# Patient Record
Sex: FEMALE
[De-identification: names, ages, dates, MRNs, and addresses within clinical notes are randomized per-mention and may not be internally consistent; named-entity substitution may affect disease eponyms.]

---

## 2017-10-24 ENCOUNTER — HOSPITAL ENCOUNTER (OUTPATIENT)
Dept: HOSPITAL 5 - SPVIMAG | Age: 72
Discharge: HOME | End: 2017-10-24
Attending: PSYCHIATRY & NEUROLOGY
Payer: MEDICARE

## 2017-10-24 DIAGNOSIS — R26.89: ICD-10-CM

## 2017-10-24 DIAGNOSIS — G31.89: Primary | ICD-10-CM

## 2017-10-24 DIAGNOSIS — G93.89: ICD-10-CM

## 2017-10-24 PROCEDURE — 70551 MRI BRAIN STEM W/O DYE: CPT

## 2017-10-30 NOTE — MAGNETIC RESONANCE REPORT
MR scan of the cranium was performed without contrast.

Pulse sequences included:

1.  T1 weighted sagittal and axial images without contrast 

2.  T2 weighted axial and coronal images

3.  FLAIR axial images

4.  Diffusion-weighted axial images

5.  Apparent diffusion coefficient images



Views of the posterior fossa showed a normal craniocervical junction.  

Cerebellar pontine angles were normal with normal seventh-eighth nerve 

complexes.  Brainstem showed an area of increased signal in the left 

anterior juancarlos as well as some increased signal in the basis pontis in 

addition to increased signal in the joann cerebri.  The cerebellum 

showed moderate atrophy.



The ventricular system showed moderate dilatation but no distortion.



Images of the hemispheres showed large areas of atrophy in the frontal 

lobes  especially in the anterior poles.  Temporal atrophy was also 

seen.  Moderate white matter changes were present in the 

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 



Sinuses, flow voids in the [place], orbits, pituitary and 

basal ganglia were normal.





Impression:



Abnormal MR scan of the cranium without contrast.

a. 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 

cerebellar atrophy

b. ventricular dilation

c. extensive atrophy of the frontal lobes and to some extent temporal 

lobes

r/o fronto temporal atrophy (Pick's disease)



this study was compared to the patient's previous mr of 6/123/2016. The 

atrophy and the araiosis have progressed.

## 2020-01-04 ENCOUNTER — HOSPITAL ENCOUNTER (INPATIENT)
Dept: HOSPITAL 5 - ED | Age: 75
LOS: 5 days | Discharge: HOME | DRG: 65 | End: 2020-01-09
Attending: INTERNAL MEDICINE | Admitting: INTERNAL MEDICINE
Payer: MEDICARE

## 2020-01-04 DIAGNOSIS — Z86.73: ICD-10-CM

## 2020-01-04 DIAGNOSIS — I48.91: ICD-10-CM

## 2020-01-04 DIAGNOSIS — I25.10: ICD-10-CM

## 2020-01-04 DIAGNOSIS — I48.0: ICD-10-CM

## 2020-01-04 DIAGNOSIS — Z88.8: ICD-10-CM

## 2020-01-04 DIAGNOSIS — Z79.01: ICD-10-CM

## 2020-01-04 DIAGNOSIS — G93.40: ICD-10-CM

## 2020-01-04 DIAGNOSIS — Z82.49: ICD-10-CM

## 2020-01-04 DIAGNOSIS — E78.5: ICD-10-CM

## 2020-01-04 DIAGNOSIS — Z90.710: ICD-10-CM

## 2020-01-04 DIAGNOSIS — E66.9: ICD-10-CM

## 2020-01-04 DIAGNOSIS — Z83.3: ICD-10-CM

## 2020-01-04 DIAGNOSIS — I16.0: ICD-10-CM

## 2020-01-04 DIAGNOSIS — I63.9: Primary | ICD-10-CM

## 2020-01-04 DIAGNOSIS — I10: ICD-10-CM

## 2020-01-04 DIAGNOSIS — Z90.49: ICD-10-CM

## 2020-01-04 LAB
ALBUMIN SERPL-MCNC: 4 G/DL (ref 3.9–5)
ALT SERPL-CCNC: 13 UNITS/L (ref 7–56)
BASOPHILS # (AUTO): 0.1 K/MM3 (ref 0–0.1)
BASOPHILS NFR BLD AUTO: 0.5 % (ref 0–1.8)
BUN SERPL-MCNC: 11 MG/DL (ref 7–17)
BUN/CREAT SERPL: 16 %
CALCIUM SERPL-MCNC: 9.9 MG/DL (ref 8.4–10.2)
EOSINOPHIL # BLD AUTO: 0.3 K/MM3 (ref 0–0.4)
EOSINOPHIL NFR BLD AUTO: 2.5 % (ref 0–4.3)
HCT VFR BLD CALC: 47.2 % (ref 30.3–42.9)
HEMOLYSIS INDEX: 8
HGB BLD-MCNC: 15.6 GM/DL (ref 10.1–14.3)
LYMPHOCYTES # BLD AUTO: 3.8 K/MM3 (ref 1.2–5.4)
LYMPHOCYTES NFR BLD AUTO: 37.6 % (ref 13.4–35)
MCHC RBC AUTO-ENTMCNC: 33 % (ref 30–34)
MCV RBC AUTO: 90 FL (ref 79–97)
MONOCYTES # (AUTO): 0.7 K/MM3 (ref 0–0.8)
MONOCYTES % (AUTO): 7.2 % (ref 0–7.3)
PLATELET # BLD: 225 K/MM3 (ref 140–440)
RBC # BLD AUTO: 5.25 M/MM3 (ref 3.65–5.03)

## 2020-01-04 PROCEDURE — 80061 LIPID PANEL: CPT

## 2020-01-04 PROCEDURE — 84484 ASSAY OF TROPONIN QUANT: CPT

## 2020-01-04 PROCEDURE — 36415 COLL VENOUS BLD VENIPUNCTURE: CPT

## 2020-01-04 PROCEDURE — 70450 CT HEAD/BRAIN W/O DYE: CPT

## 2020-01-04 PROCEDURE — 71046 X-RAY EXAM CHEST 2 VIEWS: CPT

## 2020-01-04 PROCEDURE — 93306 TTE W/DOPPLER COMPLETE: CPT

## 2020-01-04 PROCEDURE — G0378 HOSPITAL OBSERVATION PER HR: HCPCS

## 2020-01-04 PROCEDURE — 85025 COMPLETE CBC W/AUTO DIFF WBC: CPT

## 2020-01-04 PROCEDURE — 80053 COMPREHEN METABOLIC PANEL: CPT

## 2020-01-04 PROCEDURE — 82553 CREATINE MB FRACTION: CPT

## 2020-01-04 PROCEDURE — 70544 MR ANGIOGRAPHY HEAD W/O DYE: CPT

## 2020-01-04 PROCEDURE — 82550 ASSAY OF CK (CPK): CPT

## 2020-01-04 PROCEDURE — 93005 ELECTROCARDIOGRAM TRACING: CPT

## 2020-01-04 PROCEDURE — 93010 ELECTROCARDIOGRAM REPORT: CPT

## 2020-01-04 PROCEDURE — 70551 MRI BRAIN STEM W/O DYE: CPT

## 2020-01-04 NOTE — EMERGENCY DEPARTMENT REPORT
ED Neuro Deficit HPI





- General


Chief Complaint: Neuro Symptoms/Deficit


Stated Complaint: HBP/POSS TIA


Time Seen by Provider: 01/04/20 19:14


Source: patient, EMS


Mode of arrival: Ambulatory


Limitations: No Limitations





- History of Present Illness


Initial Comments: 





Patient is a 74-year-old  female with past medical history of 

hypertension and TIAs in the past who is complaining of some left arm weakness 

and slurred speech earlier today.  Patient states when she woke this morning 

approximately 8 AM she has some left arm weakness and heaviness.  Patient states

within the hour after waking the symptoms resolved.  Around 2 PM patient was on 

the phone with a friend telling her about the weakness she had that morning and 

her friend noticed that she has slurred speech.  Patient states this also 

resolved spontaneously as well.  Patient's friend urged her to come to the 

emergency department.  Patient stated when to the fire department first and her 

blood pressure was noted to be elevated to approximately 190 systolic.  Patient 

denies chest pain shortness of breath or focal neurological symptoms at this 

time.  Patient states all of her weakness and slurred speech has completely 

resolved.  Patient states she does have hypertension and is compliant with her 

medications.





- Related Data


Allergies/Adverse Reactions: 


                                    Allergies











Allergy/AdvReac Type Severity Reaction Status Date / Time


 


meperidine HCl [From Demerol] Allergy  Vomiting Unverified 06/13/16 16:23














ED Review of Systems


ROS: 


Stated complaint: HBP/POSS TIA


Other details as noted in HPI





Comment: All other systems reviewed and negative





ED Past Medical Hx





- Past Medical History


Previous Medical History?: Yes


Hx Hypertension: Yes


Additional medical history: TIA, Glycoma,





- Surgical History


Past Surgical History?: No





- Social History


Smoking Status: Never Smoker


Substance Use Type: None





ED Neuro Physical Exam





- General


Limitations: No Limitations


General appearance: alert, in no apparent distress


Suspected Stroke: No





- Head


Head exam: Present: atraumatic, normocephalic





- Eye


Eye exam: Present: normal appearance, PERRL, EOMI





- ENT


ENT exam: Present: normal orophraynx, mucous membranes moist





- Neck


Neck exam: Present: normal inspection





- Respiratory


Respiratory exam: Present: normal lung sounds bilaterally.  Absent: respiratory 

distress, wheezes, rales, rhonchi





- Cardiovascular


Cardiovascular Exam: Present: regular rate, normal rhythm, normal heart sounds. 

Absent: systolic murmur, diastolic murmur, rubs, gallop





- GI/Abdominal


GI/Abdominal exam: Present: soft, normal bowel sounds.  Absent: distended, 

tenderness, guarding





- Extremities Exam


Extremities exam: Present: normal inspection





- Back Exam


Back exam: Present: normal inspection





- Neurological Exam


Neurological exam: Present: alert, oriented X3





- NIHSS


Assessment Interval: Baseline


1a. Level of Consciousness: alert/keenly responsive


1b. LOC Questions: answers both correctly


1c. LOC Commands: performs tasks correctly


2. Best Gaze: normal


3. Visual: no visual loss


4. Facial Palsy: normal symmetrical movement


5b. Motor Arm Right: no drift


5a. Motor Arm Left: no drift


6a. Motor Leg Left: no drift


6b. Motor Leg Right: no drift


7. Limb Ataxia: absent


8. Sensory: normal


9. Best Language: no aphasia


10. Dysarthria: normal


11. Extinction/Inattention: no abnormality


Total Score: 0


Stroke Severity: No Stroke Symptoms





- Psychiatric


Psychiatric exam: Present: normal affect, normal mood





- Skin


Skin exam: Present: warm, dry, intact, normal color.  Absent: rash





ED Course





                                   Vital Signs











  01/04/20 01/04/20





  19:22 22:42


 


Temperature 98.0 F 98.4 F


 


Pulse Rate 74 81


 


Respiratory 20 13





Rate  


 


Blood Pressure 200/97 187/102





[Left]  


 


O2 Sat by Pulse 98 





Oximetry  














- Lab Data


Result diagrams: 


                                 01/04/20 19:59





                                 01/04/20 19:59





                                   Lab Results











  01/04/20 01/04/20 Range/Units





  19:59 19:59 


 


WBC  10.2   (4.5-11.0)  K/mm3


 


RBC  5.25 H   (3.65-5.03)  M/mm3


 


Hgb  15.6 H   (10.1-14.3)  gm/dl


 


Hct  47.2 H   (30.3-42.9)  %


 


MCV  90   (79-97)  fl


 


MCH  30   (28-32)  pg


 


MCHC  33   (30-34)  %


 


RDW  14.2   (13.2-15.2)  %


 


Plt Count  225   (140-440)  K/mm3


 


Lymph % (Auto)  37.6 H   (13.4-35.0)  %


 


Mono % (Auto)  7.2   (0.0-7.3)  %


 


Eos % (Auto)  2.5   (0.0-4.3)  %


 


Baso % (Auto)  0.5   (0.0-1.8)  %


 


Lymph #  3.8   (1.2-5.4)  K/mm3


 


Mono #  0.7   (0.0-0.8)  K/mm3


 


Eos #  0.3   (0.0-0.4)  K/mm3


 


Baso #  0.1   (0.0-0.1)  K/mm3


 


Seg Neutrophils %  52.2   (40.0-70.0)  %


 


Seg Neutrophils #  5.3   (1.8-7.7)  K/mm3


 


Sodium   143  (137-145)  mmol/L


 


Potassium   4.1  (3.6-5.0)  mmol/L


 


Chloride   102.1  ()  mmol/L


 


Carbon Dioxide   26  (22-30)  mmol/L


 


Anion Gap   19  mmol/L


 


BUN   11  (7-17)  mg/dL


 


Creatinine   0.7  (0.7-1.2)  mg/dL


 


Estimated GFR   > 60  ml/min


 


BUN/Creatinine Ratio   16  %


 


Glucose   124 H  ()  mg/dL


 


Calcium   9.9  (8.4-10.2)  mg/dL


 


Total Bilirubin   0.30  (0.1-1.2)  mg/dL


 


AST   20  (5-40)  units/L


 


ALT   13  (7-56)  units/L


 


Alkaline Phosphatase   89  ()  units/L


 


Troponin T   < 0.010  (0.00-0.029)  ng/mL


 


Total Protein   7.5  (6.3-8.2)  g/dL


 


Albumin   4.0  (3.9-5)  g/dL


 


Albumin/Globulin Ratio   1.1  %














- EKG Data


-: EKG Interpreted by Me


EKG shows normal: sinus rhythm, axis, intervals, QRS complexes, ST-T waves


Rate: normal


Interpretation: normal EKG (so for isolated PAC)





- Radiology Data





Ordering Physician: PIPE DE LEÓN 


 Date of Service: 01/04/20 


 Procedure(s): CT head/brain wo con 


 Accession Number(s): [id number] 





 cc: PIPE DE LEÓN 








 NONENHANCED CT SCAN OF THE HEAD: 





 INDICATION / CLINICAL INFORMATION: 


 74 years Female; headache, slurred speech at 3:30 pm. 





 TECHNIQUE: Routine CT head without contrast. All CT scans at this location are 

performed using CT 


 dose reduction for ALARA by means of automated exposure control. 





 COMPARISON: 


 MR scan of the brain from 6/13/2016 





 FINDINGS: 





 BRAIN / INTRACRANIAL CONTENTS: No acute hemorrhage, mass effect, midline shift,

 hydrocephalus, or 


 acute, large territorial infarct. Patchy areas of volume loss is seen along the

 lateral convexity in


 both cerebral hemispheres. These remain unchanged.. As seen in the previous MRI

 scan, 


 periventricular white matter low density areas are seen in both cerebral 

hemispheres due to 


 microvascular faint angiopathy. High convexity cortical sulci are normal. 





 Volume loss is seen in the cerebellar vermis and cerebellar hemispheres. Volume

 loss in the 


 cerebellar vermis has progressed since the MRI scan. Pontine ischemia seen in 

the MRI scan is not 


 obvious in the CT. 





 CRANIOCERVICAL JUNCTION: No significant abnormality. 





 ORBITS: No significant abnormality of visualized orbits. 





 SINUSES / MASTOIDS: No significant abnormality of the visualized paranasal 

sinuses or mastoid air 


 cells. 





 ADDITIONAL FINDINGS: None. 





 IMPRESSION: 


 No hemorrhage; no stroke mimics; no CT findings to suggest acute territorial 

infarction. 





 ER physician informed at 6:54 PM Central standard time 





 Signer Name: Gm Stein MD 


 Signed: 1/4/2020 7:54 PM 


 Workstation Name: [id number] 








- Medical Decision Making





Patient is a 74-year-old  female with history of hypertension who had 2

 episodes of neurological deficits today.  Both of these episodes have resolved.

  Patient admitted for observation.


Critical care attestation.: 


If time is entered above; I have spent that time in minutes in the direct care 

of this critically ill patient, excluding procedure time.








ED Disposition


Clinical Impression: 


 TIA (transient ischemic attack), Hypertensive urgency, malignant





Disposition: DC-[id number] OP ADMIT IP TO THIS HOSP


Is pt being admited?: Yes


Does the pt Need Aspirin: No


Condition: Stable


Time of Disposition: 23:04

## 2020-01-04 NOTE — CAT SCAN REPORT
NONENHANCED CT SCAN OF THE HEAD: 



INDICATION / CLINICAL INFORMATION:

74 years Female; headache, slurred speech at 3:30 pm.



TECHNIQUE: Routine CT head without contrast. All CT scans at this location are performed using CT dos
e reduction for ALARA by means of automated exposure control. 



COMPARISON: 

MR scan of the brain from 6/13/2016



FINDINGS:



BRAIN / INTRACRANIAL CONTENTS:  No acute hemorrhage, mass effect, midline shift, hydrocephalus, or ac
lisa, large territorial infarct. Patchy areas of volume loss is seen along the lateral convexity in kristi
th cerebral hemispheres. These remain unchanged.. As seen in the previous MRI scan, periventricular w
[place] matter low density areas are seen in both cerebral hemispheres due to microvascular faint angiop
athy. High convexity cortical sulci are normal.



Volume loss is seen in the cerebellar vermis and cerebellar hemispheres. Volume loss in the cerebella
r vermis has progressed since the MRI scan. Pontine ischemia seen in the MRI scan is not obvious in t
he CT.



CRANIOCERVICAL JUNCTION: No significant abnormality.



ORBITS: No significant abnormality of visualized orbits.



SINUSES / MASTOIDS: No significant abnormality of the visualized paranasal sinuses or mastoid air marlon
ls.



ADDITIONAL FINDINGS: None. 



IMPRESSION:

 No hemorrhage; no stroke mimics; no CT findings to suggest acute territorial infarction.



ER physician informed at 6:54 PM Central standard time



Signer Name: Gm Stein MD 

Signed: 1/4/2020 7:54 PM

 Workstation Name: [id number]

## 2020-01-04 NOTE — HISTORY AND PHYSICAL REPORT
History of Present Illness


History of present illness: 


74-year-old male with history of hypertension, TIA states she is compliant with 

medication comes emergency room for evaluation.  She stated that she started 

having left arm numbness that lasted for 30 minutes after which it subsided.  

Around 330 and she is developed slurred speech she called her friend who urged 

her to go to the emergency room for evaluation.  However they went to the fire 

station her blood pressure was very high so they sent her to the emergency room.

 Slurred speech lasted for an hour, she states she is back to baseline.  Patient

is being admitted for TIA work, hypertension malignant


Review Of  Systems:


Constitutional: no weight loss,  chills


Ears, eyes, nose, mouth and throat: no nasal congestion, no nasal discharge, no 

sinus pressure, blurry vision, diplopia


Neck: No neck pain or rigidity.


Cardiovascular: No  palpitations, chest pain


Respiratory: No  cough, shortness of breath


Gastrointestinal: No hematochezia


Genitourinary : no dysuria, frequency , hematuria


Musculoskeletal: no muscle ache , joint pain


Integumentary: no rash, no pruritis


Neurological: no parathesias, focal weakness


Endocrine: no cold or heat intolerance, no polyuria or polydipsia


Hematologic/Lymphatic: no easy bruising, no easy bleeding, no gland swelling


Allergic/Immunologic: no urticaria, no angioedema.





PAST MEDICAL HISTORY:hypertension, TIA





PAST SURGICAL HISTORY: Hysterectomy, cholecystectomy





FAMILY HISTORY:hypertension, diabetes





SOCIAL HISTORY: no tobacco, no  drugs,  alcohol








Medications and Allergies


                                    Allergies











Allergy/AdvReac Type Severity Reaction Status Date / Time


 


meperidine HCl [From Demerol] Allergy  Vomiting Verified 01/04/20 23:56














Exam





- Physical Exam


Narrative exam: 


Gen. appearance: Patient lying in bed, no apparent distress


HEENT: Normocephalic, atraumatic, pupils equally round and reactive to light, 

extraocular movement intact, and no sclericterus,. No JVD or thyromegaly or 

nodule,neck supple, no carotid bruit ,mucous membranes moist, no exudate or 

erythema


Heart: S1, S2, regular rate and rhythm


Lungs: Clear to auscultation bilaterally, breathing comfortable


Abdomen: Positive bowel sounds, nontender, nondistended, no organomegaly


Extremity: No edema, cyanosis, clubbing


Skin: No rash, nodules, warm, dry


Neuro: Oriented 3, cranial nerves II-12 intact, speech is fluent, motor and 

sensory intact











- Constitutional


Vitals: 


                                        











Temp Pulse Resp BP Pulse Ox


 


 98.4 F   81   13   185/92   98 


 


 01/04/20 22:42  01/04/20 23:08  01/04/20 22:42  01/04/20 23:08  01/04/20 19:22














Results





- Labs


CBC & Chem 7: 


                                 01/04/20 19:59





                                 01/04/20 19:59


Labs: 


                              Abnormal lab results











  01/04/20 01/04/20 Range/Units





  19:59 19:59 


 


RBC  5.25 H   (3.65-5.03)  M/mm3


 


Hgb  15.6 H   (10.1-14.3)  gm/dl


 


Hct  47.2 H   (30.3-42.9)  %


 


Lymph % (Auto)  37.6 H   (13.4-35.0)  %


 


Glucose   124 H  ()  mg/dL














- Imaging and Cardiology


Chest x-ray: report reviewed


CT Scan - head: report reviewed





Assessment and Plan


Assessment


Acute TIA


Obtain MRI of the head and neck, echo


Do neuro checks consult screen


Consult neurology, PT, OT


Start aspirin, statin, check lipid profile





Hypertension


Start IV hydralazine for blood pressure control


DVT prophylax

## 2020-01-04 NOTE — XRAY REPORT
CHEST 2 VIEWS 



INDICATION: 

chest pain.



COMPARISON: 

None.



FINDINGS:

Support devices: None.



Heart: Within normal limits. 

Lungs/Pleura: No acute air space or interstitial disease.   No significant pleural effusion. 



IMPRESSION:  No acute findings.



Signer Name: Bhargav Godwin MD 

Signed: 1/4/2020 8:04 PM

 Workstation Name: [id number]

## 2020-01-04 NOTE — EVENT NOTE
ED Screening Note


Date of service: 01/04/20


Time: 19:15


ED Screening Note: 


Pt complains of episode of difficulty with speech at about 3:30 pm 


hx of TIA


+HA x 3 days





This initial assessment/diagnostic orders/clinical plan/treatment(s) is/are 

subject to change based on patients health status, clinical progression and re-

assessment by fellow clinical providers in the ED. Further treatment and workup 

at subsequent clinical providers discretion. Patient/guardian urged not to elope

from the ED as their condition may be serious if not clinically assessed and 

managed. 





Initial orders include: 


CT 


labs 


CXR

## 2020-01-05 LAB — HDLC SERPL-MCNC: 70 MG/DL (ref 40–59)

## 2020-01-05 RX ADMIN — HYDRALAZINE HYDROCHLORIDE PRN MG: 20 INJECTION INTRAMUSCULAR; INTRAVENOUS at 21:25

## 2020-01-05 RX ADMIN — ACETAMINOPHEN PRN MG: 325 TABLET ORAL at 22:27

## 2020-01-05 RX ADMIN — METOPROLOL TARTRATE SCH MG: 25 TABLET, FILM COATED ORAL at 21:26

## 2020-01-05 RX ADMIN — ACETAMINOPHEN PRN MG: 325 TABLET ORAL at 08:11

## 2020-01-05 RX ADMIN — HYDRALAZINE HYDROCHLORIDE PRN MG: 20 INJECTION INTRAMUSCULAR; INTRAVENOUS at 12:23

## 2020-01-05 RX ADMIN — ASPIRIN SCH MG: 325 TABLET ORAL at 10:35

## 2020-01-05 RX ADMIN — HYDRALAZINE HYDROCHLORIDE PRN MG: 20 INJECTION INTRAMUSCULAR; INTRAVENOUS at 01:42

## 2020-01-05 RX ADMIN — ENOXAPARIN SODIUM SCH MG: 100 INJECTION SUBCUTANEOUS at 21:26

## 2020-01-05 NOTE — PROGRESS NOTE
Assessment and Plan


Assessment and plan: 


Patient is a 74-year-old  female with past medical history of 

hypertension and TIAs in the past who is complaining of some left arm weakness 

and slurred speech earlier today.  Patient states when she woke this morning 

approximately 8 AM she has some left arm weakness and heaviness.  Patient states

within the hour after waking the symptoms resolved.  Around 2 PM patient was on 

the phone with a friend telling her about the weakness she had that morning and 

her friend noticed that she has slurred speech.  Patient states this also 

resolved spontaneously as well.  


Was admitted to the hospital for stroke protocol, not a candidate for TPA








--Acute CVA/ TIA


Not a candidate for TPA


Aspirin and statin


Neuro workup, MRI/MRA carotid Doppler echocardiogram


Urology consult


PT OT rehabilitation


Lipid panel, permissive hypertension





--Hypertension;


Per stroke protocol





--Obesity; BMI 34.6


Advised weight reduction and medically stable





--DVT prophylaxis; Lovenox





Monitor clinically and adjust management as needed





Plan of care is reviewed with the patient and her nurse














History


Interval history: 


Patient seen and examined medical records reviewed


Admitted with neuro symptoms


Patient has no new complaints


 weakness significantly improved





Hospitalist Physical





- Constitutional


Vitals: 


                                        











Temp Pulse Resp BP Pulse Ox


 


 98.0 F   92 H  18   188/108   98 


 


 01/05/20 11:55  01/05/20 13:31  01/05/20 13:31  01/05/20 12:23  01/05/20 13:31











General appearance: Present: no acute distress, well-nourished





- EENT


Eyes: Present: PERRL, EOM intact





- Neck


Neck: Present: supple, normal ROM





- Respiratory


Respiratory effort: normal


Respiratory: bilateral: diminished, negative: rales, rhonchi, wheezing





- Cardiovascular


Rhythm: regular


Heart Sounds: Present: S1 & S2





- Extremities


Extremities: no ischemia, No edema





- Abdominal


General gastrointestinal: soft, non-tender, non-distended, normal bowel sounds





- Integumentary


Integumentary: Present: clear, warm





- Psychiatric


Psychiatric: appropriate mood/affect, cooperative





- Neurologic


Neurologic: CNII-XII intact, moves all extremities





Results





- Labs


CBC & Chem 7: 


                                 01/04/20 19:59





                                 01/04/20 19:59


Labs: 


                             Laboratory Last Values











WBC  10.2 K/mm3 (4.5-11.0)   01/04/20  19:59    


 


RBC  5.25 M/mm3 (3.65-5.03)  H  01/04/20  19:59    


 


Hgb  15.6 gm/dl (10.1-14.3)  H  01/04/20  19:59    


 


Hct  47.2 % (30.3-42.9)  H  01/04/20  19:59    


 


MCV  90 fl (79-97)   01/04/20  19:59    


 


MCH  30 pg (28-32)   01/04/20  19:59    


 


MCHC  33 % (30-34)   01/04/20  19:59    


 


RDW  14.2 % (13.2-15.2)   01/04/20  19:59    


 


Plt Count  225 K/mm3 (140-440)   01/04/20  19:59    


 


Lymph % (Auto)  37.6 % (13.4-35.0)  H  01/04/20  19:59    


 


Mono % (Auto)  7.2 % (0.0-7.3)   01/04/20  19:59    


 


Eos % (Auto)  2.5 % (0.0-4.3)   01/04/20  19:59    


 


Baso % (Auto)  0.5 % (0.0-1.8)   01/04/20  19:59    


 


Lymph #  3.8 K/mm3 (1.2-5.4)   01/04/20  19:59    


 


Mono #  0.7 K/mm3 (0.0-0.8)   01/04/20  19:59    


 


Eos #  0.3 K/mm3 (0.0-0.4)   01/04/20  19:59    


 


Baso #  0.1 K/mm3 (0.0-0.1)   01/04/20  19:59    


 


Seg Neutrophils %  52.2 % (40.0-70.0)   01/04/20  19:59    


 


Seg Neutrophils #  5.3 K/mm3 (1.8-7.7)   01/04/20  19:59    


 


Sodium  143 mmol/L (137-145)   01/04/20  19:59    


 


Potassium  4.1 mmol/L (3.6-5.0)   01/04/20  19:59    


 


Chloride  102.1 mmol/L ()   01/04/20  19:59    


 


Carbon Dioxide  26 mmol/L (22-30)   01/04/20  19:59    


 


Anion Gap  19 mmol/L  01/04/20  19:59    


 


BUN  11 mg/dL (7-17)   01/04/20  19:59    


 


Creatinine  0.7 mg/dL (0.7-1.2)   01/04/20  19:59    


 


Estimated GFR  > 60 ml/min  01/04/20  19:59    


 


BUN/Creatinine Ratio  16 %  01/04/20  19:59    


 


Glucose  124 mg/dL ()  H  01/04/20  19:59    


 


Calcium  9.9 mg/dL (8.4-10.2)   01/04/20  19:59    


 


Total Bilirubin  0.30 mg/dL (0.1-1.2)   01/04/20  19:59    


 


AST  20 units/L (5-40)   01/04/20  19:59    


 


ALT  13 units/L (7-56)   01/04/20  19:59    


 


Alkaline Phosphatase  89 units/L ()   01/04/20  19:59    


 


Troponin T  < 0.010 ng/mL (0.00-0.029)   01/04/20  19:59    


 


Total Protein  7.5 g/dL (6.3-8.2)   01/04/20  19:59    


 


Albumin  4.0 g/dL (3.9-5)   01/04/20  19:59    


 


Albumin/Globulin Ratio  1.1 %  01/04/20  19:59    


 


Triglycerides  117 mg/dL (2-149)   01/05/20  08:52    


 


Cholesterol  146 mg/dL ()   01/05/20  08:52    


 


LDL Cholesterol Direct  66 mg/dL ()   01/05/20  08:52    


 


HDL Cholesterol  70 mg/dL (40-59)  H  01/05/20  08:52    


 


Cholesterol/HDL Ratio  2.08 %  01/05/20  08:52    














Active Medications





- Current Medications


Current Medications: 














Generic Name Dose Route Start Last Admin





  Trade Name Freq  PRN Reason Stop Dose Admin


 


Acetaminophen  650 mg  01/04/20 23:41  01/05/20 08:11





  Tylenol  PO   650 mg





  Q4H PRN   Administration





  Pain, Mild (1-3)  


 


Aspirin  325 mg  01/05/20 10:00  01/05/20 10:35





  Aspirin  PO   325 mg





  QDAY CHRISITAN   Administration


 


Atorvastatin Calcium  40 mg  01/05/20 22:00 





  Lipitor  PO  





  QHS CHRISTIAN  


 


Bisacodyl  10 mg  01/04/20 23:41 





  Dulcolax  [place]  





  QDAY PRN  





  Constipation  


 


Hydralazine HCl  5 mg  01/04/20 23:43  01/05/20 12:23





  Apresoline  IV   5 mg





  Q6H PRN   Administration





  Hypertension  


 


Magnesium Hydroxide  30 ml  01/04/20 23:41 





  Milk Of Magnesia  PO  





  Q4H PRN  





  Constipation  


 


Ondansetron HCl  4 mg  01/04/20 23:41  01/05/20 02:28





  Zofran  IV   4 mg





  Q8H PRN   Administration





  Nausea And Vomiting  


 


Sodium Chloride  10 ml  01/04/20 23:41 





  Sodium Chloride Flush Syringe 10 Ml  IV  





  PRN PRN  





  LINE FLUSH

## 2020-01-05 NOTE — PROGRESS NOTE
Subjective


Date of service: 01/05/20


Interval history: 


neurology note reviewed over all the labs / Imaging studies / clinical notes / 

course of care in the ED   the CT shows 4 plus very striking severe degenerative

changes both gray and white mattwer... would be very difficult to detect stroke 

since there is so little intact normal brain present  ,,, brain is profoundly 

atrophic








Objective





- Vital Sign


                               Vital Signs - 12hr











  01/04/20 01/04/20 01/04/20





  22:38 22:42 22:45


 


Temperature  98.4 F 


 


Pulse Rate  81 77


 


Respiratory  13 14





Rate   


 


Blood Pressure 149/83  191/92


 


Blood Pressure  187/102 





[Left]   


 


O2 Sat by Pulse 93  88





Oximetry   














  01/04/20 01/04/20 01/04/20





  23:01 23:08 23:15


 


Temperature   


 


Pulse Rate 80 81 76


 


Respiratory 17  17





Rate   


 


Blood Pressure 180/93 185/92 159/76


 


Blood Pressure   





[Left]   


 


O2 Sat by Pulse 97  97





Oximetry   














  01/04/20 01/04/20 01/05/20





  23:31 23:45 00:01


 


Temperature   


 


Pulse Rate 77 74 78


 


Respiratory 13 15 16





Rate   


 


Blood Pressure 201/107 192/88 167/84


 


Blood Pressure   





[Left]   


 


O2 Sat by Pulse 92 98 97





Oximetry   














  01/05/20 01/05/20 01/05/20





  00:15 00:31 00:45


 


Temperature   


 


Pulse Rate 80 84 84


 


Respiratory 13 16 14





Rate   


 


Blood Pressure 171/94 197/70 193/85


 


Blood Pressure   





[Left]   


 


O2 Sat by Pulse 97 98 99





Oximetry   














  01/05/20 01/05/20 01/05/20





  01:01 01:15 01:30


 


Temperature   


 


Pulse Rate 87 80 84


 


Respiratory 12 16 20





Rate   


 


Blood Pressure 185/86 190/88 176/84


 


Blood Pressure   





[Left]   


 


O2 Sat by Pulse 99 97 96





Oximetry   














  01/05/20 01/05/20 01/05/20





  01:42 01:45 02:00


 


Temperature   


 


Pulse Rate 84 87 90


 


Respiratory  18 13





Rate   


 


Blood Pressure 193/85 162/82 146/77


 


Blood Pressure   





[Left]   


 


O2 Sat by Pulse  97 97





Oximetry   














  01/05/20 01/05/20 01/05/20





  02:15 02:31 02:45


 


Temperature   


 


Pulse Rate 100 H 88 89


 


Respiratory 13 20 13





Rate   


 


Blood Pressure 146/77 163/67 164/76


 


Blood Pressure   





[Left]   


 


O2 Sat by Pulse 96 95 97





Oximetry   














  01/05/20 01/05/20 01/05/20





  03:00 03:15 03:30


 


Temperature   


 


Pulse Rate 90 89 94 H


 


Respiratory 15 16 17





Rate   


 


Blood Pressure 163/77 176/81 168/84


 


Blood Pressure   





[Left]   


 


O2 Sat by Pulse 95 95 96





Oximetry   














  01/05/20 01/05/20 01/05/20





  03:45 04:00 04:15


 


Temperature   


 


Pulse Rate 99 H 97 H 94 H


 


Respiratory 18 17 19





Rate   


 


Blood Pressure 168/84 189/77 183/81


 


Blood Pressure   





[Left]   


 


O2 Sat by Pulse 96 96 96





Oximetry   














  01/05/20 01/05/20 01/05/20





  04:30 04:45 05:01


 


Temperature   


 


Pulse Rate 100 H 106 H 105 H


 


Respiratory 16 18 16





Rate   


 


Blood Pressure 190/82 213/84 172/73


 


Blood Pressure   





[Left]   


 


O2 Sat by Pulse 96 95 96





Oximetry   














  01/05/20 01/05/20 01/05/20





  05:15 05:31 05:45


 


Temperature   


 


Pulse Rate 106 H 109 H 109 H


 


Respiratory 22 15 15





Rate   


 


Blood Pressure 170/93 200/87 171/92


 


Blood Pressure   





[Left]   


 


O2 Sat by Pulse 96 96 97





Oximetry   














  01/05/20





  07:42


 


Temperature 


 


Pulse Rate 112 H


 


Respiratory 21





Rate 


 


Blood Pressure 


 


Blood Pressure 121/61





[Left] 


 


O2 Sat by Pulse 97





Oximetry 














- Laboratory Findings


CBC and BMP: 


                                 01/04/20 19:59





                                 01/04/20 19:59


Abnormal Lab Findings: 


                                  Abnormal Labs











  01/04/20 01/04/20





  19:59 19:59


 


RBC  5.25 H 


 


Hgb  15.6 H 


 


Hct  47.2 H 


 


Lymph % (Auto)  37.6 H 


 


Glucose   124 H

## 2020-01-06 RX ADMIN — ENOXAPARIN SODIUM SCH: 100 INJECTION SUBCUTANEOUS at 22:18

## 2020-01-06 RX ADMIN — ASPIRIN SCH MG: 325 TABLET ORAL at 10:00

## 2020-01-06 RX ADMIN — METOPROLOL TARTRATE SCH MG: 25 TABLET, FILM COATED ORAL at 22:20

## 2020-01-06 RX ADMIN — METOPROLOL TARTRATE SCH MG: 25 TABLET, FILM COATED ORAL at 10:13

## 2020-01-06 NOTE — CONSULTATION
HISTORY OF PRESENT ILLNESS:  This is a 74-year-old white female that presents to

[facility].  She is seen by herself.  There is no family

history and no family members escorting her that I can obtain a more reliable

history from.  I cannot be sure exactly when she began to get sick.  She seems

to be somewhat confused as to how she ended up being at the hospital, but she

did state that she went to a fire station about 48 hours ago because she was not

feeling well.  Her blood pressure at that point was 200/120 and she had only

been taking atenolol.  She realized she had a problem, began to feel dizzy, off

balance, had slurred speech and from her own experience this had occurred in a

previous episode of transient ischemic attack.  She presented to the Emergency

Room from what she tells me a  from ____ [employer] who she is familiar with

stayed with her for a prolonged period of time, but she does not have a very

good recollection for what happened.  At this point, her mental state is

somewhat cleared.  She feels better.  She is less dizzy, less weak, does not

feel confused and has series of disoriented thoughts that at this point seems to

be clearing.



ALLERGIES:  None.



PAST MEDICAL HISTORY:  Stroke, TIA.



MEDICATIONS:  Atenolol.



PHYSICAL EXAMINATION:

VITAL SIGNS:  Her blood pressure at the bedside with the nurse checking at with

the automated machine is 180/105, pulse rate is 87.  Her pO2 on room air is 98%.

NEUROLOGIC:  Her  strength is equal.  Her cranial nerves are intact.  Her

speech is good.  Orientation is excellent.  She can stand up and walk, but she

is very off balance.  I did observe this that she seems to be extremely poorly

coordinated.  Reflexes are intact.  Sensory examination is normal.  Visual

fields are full.



IMPRESSION:  This patient has acute encephalopathy.  I suspect she is having a

number of small strokes.  There were very impressive abnormalities present on

her CT scan, which are old with gray and white matter atrophy and evidence of

previous white matter strokes.  Actually from the appearance of the CT scan, her

clinical condition is much more severe than her examination, which is an

interesting mismatch on the imaging versus the neurological exam.  MRI scan is

probably indicated.  I did indicate the patient her theory that the blood

pressure control is an issue certainly quite relevant at this point and that

even at this point, her blood pressure is quite elevated.  Await echocardiogram,

carotid artery ultrasound and other diagnostic workup.





DD: 01/05/2020 18:27

DT: 01/06/2020 00:54

JOB# [id number]  [id number]

JOSY/NTS

## 2020-01-06 NOTE — CONSULTATION
History of Present Illness


Consult date: 01/06/20


Consult reason: arrhythmia


History of present illness: 





This is a 74-year old woman who presented 2 days ago with left upper extremity 

weakness and dysarthria. Neurological deficits has since resolved. Head CT scan 

reports no acute intracranial abnormalities. An echocardiogram shows a normal 

left ventricular systolic function, ejection fraction 65-70%. Bubble study is 

negative. Further neurology workup is in process. 





Overnight, there was a short burst of wide complex tachycardia followed with 

transient atrial fibrillation on telemetry. She returned to stable sinus rhythm 

with no specific treatment. There is no history of atrial fibrillation. A 

cardiac consultation has been requested for further evaluation and management. 





Medications and Allergies


                                    Allergies











Allergy/AdvReac Type Severity Reaction Status Date / Time


 


meperidine HCl [From Demerol] Allergy  Vomiting Verified 01/04/20 23:56











Active Meds: 


Active Medications





Acetaminophen (Tylenol)  650 mg PO Q4H PRN


   PRN Reason: Pain, Mild (1-3)


   Last Admin: 01/05/20 22:27 Dose:  650 mg


   Documented by: 


Aspirin (Aspirin)  325 mg PO QDAY [facility]


   Last Admin: 01/06/20 10:00 Dose:  325 mg


   Documented by: 


Atorvastatin Calcium (Lipitor)  40 mg PO QHS [facility]


   Last Admin: 01/05/20 21:26 Dose:  40 mg


   Documented by: 


Bisacodyl (Dulcolax)  10 mg CO QDAY PRN


   PRN Reason: Constipation


Enoxaparin Sodium (Enoxaparin)  40 mg SUB-Q QDAY@2200 [facility]


   Last Admin: 01/05/20 21:26 Dose:  40 mg


   Documented by: 


Hydralazine HCl (Apresoline)  5 mg IV Q6H PRN


   PRN Reason: Hypertension


   Last Admin: 01/05/20 21:25 Dose:  5 mg


   Documented by: 


Magnesium Hydroxide (Milk Of Magnesia)  30 ml PO Q4H PRN


   PRN Reason: Constipation


Metoprolol Tartrate (Metoprolol)  12.5 mg PO BID [facility]


   Last Admin: 01/06/20 10:13 Dose:  12.5 mg


   Documented by: 


Morphine Sulfate (Morphine)  2 mg IV Q4H PRN


   PRN Reason: Pain, Moderate (4-6)


Nitroglycerin (Nitrostat)  0.4 mg SL .Q5MIN PRN


   PRN Reason: Chest Pain


Ondansetron HCl (Zofran)  4 mg IV Q8H PRN


   PRN Reason: Nausea And Vomiting


   Last Admin: 01/05/20 02:28 Dose:  4 mg


   Documented by: 


Sodium Chloride (Sodium Chloride Flush Syringe 10 Ml)  10 ml IV PRN PRN


   PRN Reason: LINE FLUSH


   Last Admin: 01/05/20 21:28 Dose:  10 ml


   Documented by: 











Physical Examination


                                   Vital Signs











Temp Pulse Resp BP Pulse Ox


 


 98.0 F   74   20   200/97   98 


 


 01/04/20 19:22  01/04/20 19:22  01/04/20 19:22  01/04/20 19:22  01/04/20 19:22














Results





                                 01/04/20 19:59





                                 01/04/20 19:59


                                 Cardiac Enzymes











  01/06/20 01/06/20 Range/Units





  03:01 06:43 


 


CK-MB (CK-2)  7.0 H  6.9 H  (0.0-4.0)  ng/mL

## 2020-01-06 NOTE — MAGNETIC RESONANCE REPORT
MR MRA/MRV head wo con



INDICATION / CLINICAL INFORMATION:

74 years Female; stroke.



TECHNIQUE: 3-D time of flight.  NASCET type criteria used to evaluate stenoses.



COMPARISON: 

None available.



FINDINGS:



INTERNAL CAROTID ARTERIES: The motion degrades image quality at. However, there appears be mild ather
osclerotic calcification involving the distal internal carotid arteries without clear evidence of sig
nificant focal stenosis by NASCET criteria.

VERTEBROBASILAR SYSTEM: No significant narrowing appreciated.



DISTAL BRANCHES: The proximal cerebral arteries are also degraded by the degree of motion with sugges
tion of mild diffuse atherosclerotic disease at. There appears be a mild to moderate focal stenosis a
t the left MCA trifurcation. Milder narrowing is seen on the right.



ANEURYSM: None identified.



IMPRESSION:

The motion degrades image quality at. However, there appears be mild diffuse atherosclerotic calcific
ation involving intracranial vessels as described with mild to moderate focal stenosis involving the 
left MCA trifurcation.



Signer Name: Kishan Anaya MD 

Signed: 1/6/2020 12:43 PM

 Workstation Name: [id number]

## 2020-01-06 NOTE — MAGNETIC RESONANCE REPORT
MRI BRAIN WITHOUT CONTRAST



INDICATION / CLINICAL INFORMATION:

stroke. 



TECHNIQUE: 

Multiplanar, multisequence MR images of the brain were obtained. 



COMPARISON: 

The study is compared to the previous MRI of 10/24/2017.



FINDINGS: There is continued extensive cerebral and pontine white matter disease most consistent with
 advanced microvascular angiopathy at. There are old small lacunar infarcts involving the basal gangl
ia and left juancarlos at. The findings correlate with previous exam of.



On the diffusion imaging, there is a 4-5 mm focus of mild increased signal along the posterior right 
parietal subcortical region. A more subtle focus is seen anteriorly along the right frontal subcortic
al region. Correlation would be needed regarding small foci of subacute infarction.



On the susceptibility weighted imaging, there are multiple scattered small foci of marked decreased s
ignal, particularly involving the basal ganglia, thalami and brainstem. Findings be a most consistent
 with chronic microhemorrhages though may also be seen with cerebral amyloidosis.



There is continued advanced cerebral atrophy, most notably involving frontal and temporal lobes as pr
eviously described. There is associated prominence of the ventricular system. There is also notable c
erebellar atrophy.



CRANIOCERVICAL JUNCTION: There is no significant stenosis at the craniocervical junction.

VASCULAR FLOW-VOIDS: No significant abnormality.



ORBITS: No significant abnormality of visualized orbits.

SINUSES / MASTOIDS: No significant abnormality of the visualized paranasal sinuses or mastoid air marlon
ls.



ADDITIONAL FINDINGS: None. 



IMPRESSION:

1. There is continued extensive microvascular angiopathy as detailed above. The small foci of subtle 
increased diffusion signal along the right parietal and frontal subcortical regions may reflect small
 a subacute to infarcts.



2. There is also persistent advanced to cerebral atrophy, most notably involving frontal and temporal
 lobes.



Signer Name: Kishan Anaya MD 

Signed: 1/6/2020 12:39 PM

 Workstation Name: [id number]

## 2020-01-06 NOTE — PROGRESS NOTE
Assessment and Plan


Assessment and plan: 


Patient is a 74-year-old  female with past medical history of 

hypertension and TIAs in the past who is complaining of some left arm weakness 

and slurred speech earlier today.  Patient states when she woke this morning 

approximately 8 AM she has some left arm weakness and heaviness.  Patient states

within the hour after waking the symptoms resolved.  Around 2 PM patient was on 

the phone with a friend telling her about the weakness she had that morning and 

her friend noticed that she has slurred speech.  Patient states this also 

resolved spontaneously as well.  


Was admitted to the hospitals for stroke protocol, not a candidate for TPA








Neuro workup so far;


MRI small foci of surgical increased diffusion signal along the right maxilla 

and frontal 


subcortical lesions may reflect small subacute infarct


Persistent advanced cerebral atrophy involving the frontal and temporal


  MRA:: Mild to moderate focal stenosis involving the left MCA.  Trifurcation


CT head without contrast; no hemorrhoidsstroke


  Echo; LDH ,EF 65-70%,





--Acute CVA[small subacute infarct right frontal area]


Not a candidate for TPA


Aspirin and statin


Allergy following


PT OT rehabilitation


Lipid panel, permissive hypertension





--Hypertension; controlled


Continue current antihypertensives and when necessary medications





--Obesity; BMI 34.6


Advised weight reduction and medically stable





--DVT prophylaxis; Lovenox





Monitor clinically and adjust management as needed


Plan of care is reviewed with the patient and her nurse





Disposition; follow consultants recommendations


Discharge when medically stable

















History


Interval history: 


Patient seen and examined medical records reviewed


Neuro workup is in progress


The patient feels better no new complaints


Vital signs noted





Hospitalist Physical





- Constitutional


Vitals: 


                                        











Temp Pulse Resp BP Pulse Ox


 


 97.5 F L  94 H  18   150/75   97 


 


 01/06/20 09:13  01/06/20 10:13  01/06/20 09:13  01/06/20 10:13  01/06/20 09:13











General appearance: Present: no acute distress, well-nourished





- EENT


Eyes: Present: PERRL, EOM intact





- Neck


Neck: Present: supple, normal ROM





- Respiratory


Respiratory effort: normal


Respiratory: bilateral: diminished, negative: rales, rhonchi, wheezing





- Cardiovascular


Rhythm: regular


Heart Sounds: Present: S1 & S2





- Extremities


Extremities: no ischemia, No edema





- Abdominal


General gastrointestinal: soft, non-tender, non-distended, normal bowel sounds





- Integumentary


Integumentary: Present: clear, warm





- Psychiatric


Psychiatric: appropriate mood/affect





- Neurologic


Neurologic: CNII-XII intact, moves all extremities





Results





- Labs


CBC & Chem 7: 


                                 01/04/20 19:59





                                 01/04/20 19:59


Labs: 


                             Laboratory Last Values











WBC  10.2 K/mm3 (4.5-11.0)   01/04/20  19:59    


 


RBC  5.25 M/mm3 (3.65-5.03)  H  01/04/20  19:59    


 


Hgb  15.6 gm/dl (10.1-14.3)  H  01/04/20  19:59    


 


Hct  47.2 % (30.3-42.9)  H  01/04/20  19:59    


 


MCV  90 fl (79-97)   01/04/20  19:59    


 


MCH  30 pg (28-32)   01/04/20  19:59    


 


MCHC  33 % (30-34)   01/04/20  19:59    


 


RDW  14.2 % (13.2-15.2)   01/04/20  19:59    


 


Plt Count  225 K/mm3 (140-440)   01/04/20  19:59    


 


Lymph % (Auto)  37.6 % (13.4-35.0)  H  01/04/20  19:59    


 


Mono % (Auto)  7.2 % (0.0-7.3)   01/04/20  19:59    


 


Eos % (Auto)  2.5 % (0.0-4.3)   01/04/20  19:59    


 


Baso % (Auto)  0.5 % (0.0-1.8)   01/04/20  19:59    


 


Lymph #  3.8 K/mm3 (1.2-5.4)   01/04/20  19:59    


 


Mono #  0.7 K/mm3 (0.0-0.8)   01/04/20  19:59    


 


Eos #  0.3 K/mm3 (0.0-0.4)   01/04/20  19:59    


 


Baso #  0.1 K/mm3 (0.0-0.1)   01/04/20  19:59    


 


Seg Neutrophils %  52.2 % (40.0-70.0)   01/04/20  19:59    


 


Seg Neutrophils #  5.3 K/mm3 (1.8-7.7)   01/04/20  19:59    


 


Sodium  143 mmol/L (137-145)   01/04/20  19:59    


 


Potassium  4.1 mmol/L (3.6-5.0)   01/04/20  19:59    


 


Chloride  102.1 mmol/L ()   01/04/20  19:59    


 


Carbon Dioxide  26 mmol/L (22-30)   01/04/20  19:59    


 


Anion Gap  19 mmol/L  01/04/20  19:59    


 


BUN  11 mg/dL (7-17)   01/04/20  19:59    


 


Creatinine  0.7 mg/dL (0.7-1.2)   01/04/20  19:59    


 


Estimated GFR  > 60 ml/min  01/04/20  19:59    


 


BUN/Creatinine Ratio  16 %  01/04/20  19:59    


 


Glucose  124 mg/dL ()  H  01/04/20  19:59    


 


Calcium  9.9 mg/dL (8.4-10.2)   01/04/20  19:59    


 


Total Bilirubin  0.30 mg/dL (0.1-1.2)   01/04/20  19:59    


 


AST  20 units/L (5-40)   01/04/20  19:59    


 


ALT  13 units/L (7-56)   01/04/20  19:59    


 


Alkaline Phosphatase  89 units/L ()   01/04/20  19:59    


 


Total Creatine Kinase  65 units/L ()   01/06/20  06:43    


 


CK-MB (CK-2)  6.9 ng/mL (0.0-4.0)  H  01/06/20  06:43    


 


CK-MB (CK-2) Rel Index  10.6  (0-4)  H  01/06/20  06:43    


 


Troponin T  < 0.010 ng/mL (0.00-0.029)   01/06/20  06:43    


 


Total Protein  7.5 g/dL (6.3-8.2)   01/04/20  19:59    


 


Albumin  4.0 g/dL (3.9-5)   01/04/20  19:59    


 


Albumin/Globulin Ratio  1.1 %  01/04/20  19:59    


 


Triglycerides  117 mg/dL (2-149)   01/05/20  08:52    


 


Cholesterol  146 mg/dL ()   01/05/20  08:52    


 


LDL Cholesterol Direct  66 mg/dL ()   01/05/20  08:52    


 


HDL Cholesterol  70 mg/dL (40-59)  H  01/05/20  08:52    


 


Cholesterol/HDL Ratio  2.08 %  01/05/20  08:52    














Active Medications





- Current Medications


Current Medications: 














Generic Name Dose Route Start Last Admin





  Trade Name Freq  PRN Reason Stop Dose Admin


 


Acetaminophen  650 mg  01/04/20 23:41  01/05/20 22:27





  Tylenol  PO   650 mg





  Q4H PRN   Administration





  Pain, Mild (1-3)  


 


Aspirin  325 mg  01/05/20 10:00  01/06/20 10:00





  Aspirin  PO   325 mg





  QDAY [facility]   Administration


 


Atorvastatin Calcium  40 mg  01/05/20 22:00  01/05/20 21:26





  Lipitor  PO   40 mg





  QHS [facility]   Administration


 


Bisacodyl  10 mg  01/04/20 23:41 





  Dulcolax  SC  





  QDAY PRN  





  Constipation  


 


Enoxaparin Sodium  40 mg  01/05/20 22:00  01/05/20 21:26





  Enoxaparin  SUB-Q   40 mg





  QDAY@2200 [facility]   Administration


 


Hydralazine HCl  5 mg  01/04/20 23:43  01/05/20 21:25





  Apresoline  IV   5 mg





  Q6H PRN   Administration





  Hypertension  


 


Magnesium Hydroxide  30 ml  01/04/20 23:41 





  Milk Of Magnesia  PO  





  Q4H PRN  





  Constipation  


 


Metoprolol Tartrate  12.5 mg  01/05/20 22:00  01/06/20 10:13





  Metoprolol  PO   12.5 mg





  BID [facility]   Administration


 


Morphine Sulfate  2 mg  01/06/20 02:01 





  Morphine  IV  





  Q4H PRN  





  Pain, Moderate (4-6)  


 


Nitroglycerin  0.4 mg  01/06/20 02:01 





  Nitrostat  SL  





  .Q5MIN PRN  





  Chest Pain  


 


Ondansetron HCl  4 mg  01/04/20 23:41  01/05/20 02:28





  Zofran  IV   4 mg





  Q8H PRN   Administration





  Nausea And Vomiting  


 


Sodium Chloride  10 ml  01/04/20 23:41  01/05/20 21:28





  Sodium Chloride Flush Syringe 10 Ml  IV   10 ml





  PRN PRN   Administration





  LINE FLUSH

## 2020-01-06 NOTE — PROGRESS NOTE
Subjective


Date of service: 01/06/20


Interval history: 


went over the details of the event note  EKG ordered and the enzymes are ordered

  BP is trending better   will follow up on MRI today








Objective





- Vital Sign


                               Vital Signs - 12hr











  01/05/20 01/05/20 01/06/20





  21:17 21:20 00:00


 


Temperature  98.9 F 


 


Pulse Rate 85 96 H 110 H


 


Respiratory  18 





Rate   


 


Blood Pressure   


 


Blood Pressure  175/94 





[Left]   


 


O2 Sat by Pulse 97 96 





Oximetry   














  01/06/20 01/06/20





  00:23 05:22


 


Temperature 97.8 F 98.0 F


 


Pulse Rate 113 H 103 H


 


Respiratory 18 18





Rate  


 


Blood Pressure 164/73 154/79


 


Blood Pressure  





[Left]  


 


O2 Sat by Pulse 99 96





Oximetry  














- Laboratory Findings


CBC and BMP: 


                                 01/04/20 19:59





                                 01/04/20 19:59


Abnormal Lab Findings: 


                                  Abnormal Labs











  01/04/20 01/04/20 01/05/20





  19:59 19:59 08:52


 


RBC  5.25 H  


 


Hgb  15.6 H  


 


Hct  47.2 H  


 


Lymph % (Auto)  37.6 H  


 


Glucose   124 H 


 


CK-MB (CK-2)   


 


CK-MB (CK-2) Rel Index   


 


HDL Cholesterol    70 H














  01/06/20





  03:01


 


RBC 


 


Hgb 


 


Hct 


 


Lymph % (Auto) 


 


Glucose 


 


CK-MB (CK-2)  7.0 H


 


CK-MB (CK-2) Rel Index  9.0 H


 


HDL Cholesterol

## 2020-01-06 NOTE — EVENT NOTE
Date: 01/06/20





Patient complains of nonradiating chest pressure which she rates 4/10.  Notified

by telemetry patient had 4 beat run of V. tach.  Stat EKG was performed which 

showed sinus tach with a regular heart rate.  Stat cardiac enzymes ordered and 

cardiology consulted

## 2020-01-07 RX ADMIN — ACETAMINOPHEN PRN MG: 325 TABLET ORAL at 22:45

## 2020-01-07 RX ADMIN — ENOXAPARIN SODIUM SCH: 100 INJECTION SUBCUTANEOUS at 22:00

## 2020-01-07 RX ADMIN — METOPROLOL TARTRATE SCH MG: 25 TABLET, FILM COATED ORAL at 09:40

## 2020-01-07 RX ADMIN — METOPROLOL TARTRATE SCH MG: 50 TABLET, FILM COATED ORAL at 22:44

## 2020-01-07 RX ADMIN — HYDRALAZINE HYDROCHLORIDE PRN MG: 20 INJECTION INTRAMUSCULAR; INTRAVENOUS at 19:36

## 2020-01-07 RX ADMIN — ASPIRIN SCH MG: 325 TABLET ORAL at 09:40

## 2020-01-07 RX ADMIN — ACETAMINOPHEN PRN MG: 325 TABLET ORAL at 11:36

## 2020-01-07 NOTE — PROGRESS NOTE
Subjective


Date of service: 01/07/20


Interval history: 


went over the MRI and the majort problem is severe chronic atrophy  there is 

mild suggestion of small subacute infarct   the important issue is there is no 

large acute infarct  this could certainly be related to HTN agree with medical 

management








Objective





- Vital Sign


                               Vital Signs - 12hr











  01/07/20 01/07/20 01/07/20





  04:03 08:42 09:40


 


Temperature 98.0 F 98.4 F 


 


Pulse Rate 89 99 H 105 H


 


Pulse Rate [   





Apical]   


 


Pulse Rate [   





Left Radial]   


 


Pulse Rate [   





Right Radial]   


 


Respiratory 18 18 





Rate   


 


Blood Pressure 151/71 154/97 179/78


 


Blood Pressure   





[Left]   


 


O2 Sat by Pulse 99 96 





Oximetry   














  01/07/20 01/07/20





  10:00 11:30


 


Temperature  98.0 F


 


Pulse Rate 76 82


 


Pulse Rate [ 87 





Apical]  


 


Pulse Rate [ 87 





Left Radial]  


 


Pulse Rate [ 87 





Right Radial]  


 


Respiratory 19 19





Rate  


 


Blood Pressure  


 


Blood Pressure  149/64





[Left]  


 


O2 Sat by Pulse 100 





Oximetry  














- Laboratory Findings


CBC and BMP: 


                                 01/04/20 19:59





                                 01/04/20 19:59


Abnormal Lab Findings: 


                                  Abnormal Labs











  01/04/20 01/04/20 01/05/20





  19:59 19:59 08:52


 


RBC  5.25 H  


 


Hgb  15.6 H  


 


Hct  47.2 H  


 


Lymph % (Auto)  37.6 H  


 


Glucose   124 H 


 


CK-MB (CK-2)   


 


CK-MB (CK-2) Rel Index   


 


HDL Cholesterol    70 H














  01/06/20 01/06/20





  03:01 06:43


 


RBC  


 


Hgb  


 


Hct  


 


Lymph % (Auto)  


 


Glucose  


 


CK-MB (CK-2)  7.0 H  6.9 H


 


CK-MB (CK-2) Rel Index  9.0 H  10.6 H


 


HDL Cholesterol

## 2020-01-07 NOTE — PROGRESS NOTE
Assessment and Plan


Assessment and plan: 


Patient is a 74-year-old  female with past medical history of 

hypertension and TIAs in the past who is complaining of some left arm weakness 

and slurred speech earlier today.  Patient states when she woke this morning 

approximately 8 AM she has some left arm weakness and heaviness.  Patient states

within the hour after waking the symptoms resolved.  Around 2 PM patient was on 

the phone with a friend telling her about the weakness she had that morning and 

her friend noticed that she has slurred speech.  Patient states this also 

resolved spontaneously as well.  


Was admitted to the \[facility]\"" for stroke protocol, not a candidate for TPA








Neuro workup so far;


MRI small foci of surgical increased diffusion signal along the right maxilla 

and frontal 


subcortical lesions may reflect small subacute infarct


Persistent advanced cerebral atrophy involving the frontal and temporal


MRA:: Mild to moderate focal stenosis involving the left MCA.  Trifurcation


CT head without contrast; no hemorrhoidsstroke


Echo; LDH ,EF 65-70%,





--Acute CVA[small subacute infarct right frontal area]


Not a candidate for TPA


Aspirin and statin


Allergy following


PT OT rehabilitation


Lipid panel, permissive hypertension





--New Onset atrial fibrillation;


Rate controlled, cardiology following


Continue beta blockers


Anticoagulation per neurology recommendations





--Hypertension; controlled


Continue current antihypertensives and when necessary medications





--Obesity; BMI 34.6


Advised weight reduction and medically stable





--DVT prophylaxis; Lovenox





Monitor clinically and adjust management as needed


Plan of care is reviewed with the patient and her nurse





Disposition; follow neurology recommendations for anticoagulation


Discharge when medically stable











History


Interval history: 


Patient seen and examined medical records reviewed


Patient feels better no neurologic deficit


Ambulatory, speech clear


Vital signs reviewed





Hospitalist Physical





- Constitutional


Vitals: 


                                        











Temp Pulse Resp BP Pulse Ox


 


 98.0 F   86   19   183/78   83 L


 


 01/07/20 11:30  01/07/20 19:36  01/07/20 11:30  01/07/20 19:36  01/07/20 18:39











General appearance: Present: no acute distress, well-nourished





- EENT


Eyes: Present: PERRL, EOM intact





- Neck


Neck: Present: supple, normal ROM





- Respiratory


Respiratory effort: normal


Respiratory: negative: rales, rhonchi, wheezing





- Cardiovascular


Rhythm: regular


Heart Sounds: Present: S1 & S2





- Extremities


Extremities: no ischemia, No edema





- Abdominal


General gastrointestinal: soft, non-tender, non-distended, normal bowel sounds





- Integumentary


Integumentary: Present: clear, warm





- Psychiatric


Psychiatric: appropriate mood/affect, cooperative





- Neurologic


Neurologic: CNII-XII intact, moves all extremities





Results





- Labs


CBC & Chem 7: 


                                 01/04/20 19:59





                                 01/04/20 19:59


Labs: 


                             Laboratory Last Values











WBC  10.2 K/mm3 (4.5-11.0)   01/04/20  19:59    


 


RBC  5.25 M/mm3 (3.65-5.03)  H  01/04/20  19:59    


 


Hgb  15.6 gm/dl (10.1-14.3)  H  01/04/20  19:59    


 


Hct  47.2 % (30.3-42.9)  H  01/04/20  19:59    


 


MCV  90 fl (79-97)   01/04/20  19:59    


 


MCH  30 pg (28-32)   01/04/20  19:59    


 


MCHC  33 % (30-34)   01/04/20  19:59    


 


RDW  14.2 % (13.2-15.2)   01/04/20  19:59    


 


Plt Count  225 K/mm3 (140-440)   01/04/20  19:59    


 


Lymph % (Auto)  37.6 % (13.4-35.0)  H  01/04/20  19:59    


 


Mono % (Auto)  7.2 % (0.0-7.3)   01/04/20  19:59    


 


Eos % (Auto)  2.5 % (0.0-4.3)   01/04/20  19:59    


 


Baso % (Auto)  0.5 % (0.0-1.8)   01/04/20  19:59    


 


Lymph #  3.8 K/mm3 (1.2-5.4)   01/04/20  19:59    


 


Mono #  0.7 K/mm3 (0.0-0.8)   01/04/20  19:59    


 


Eos #  0.3 K/mm3 (0.0-0.4)   01/04/20  19:59    


 


Baso #  0.1 K/mm3 (0.0-0.1)   01/04/20  19:59    


 


Seg Neutrophils %  52.2 % (40.0-70.0)   01/04/20  19:59    


 


Seg Neutrophils #  5.3 K/mm3 (1.8-7.7)   01/04/20  19:59    


 


Sodium  143 mmol/L (137-145)   01/04/20  19:59    


 


Potassium  4.1 mmol/L (3.6-5.0)   01/04/20  19:59    


 


Chloride  102.1 mmol/L ()   01/04/20  19:59    


 


Carbon Dioxide  26 mmol/L (22-30)   01/04/20  19:59    


 


Anion Gap  19 mmol/L  01/04/20  19:59    


 


BUN  11 mg/dL (7-17)   01/04/20  19:59    


 


Creatinine  0.7 mg/dL (0.7-1.2)   01/04/20  19:59    


 


Estimated GFR  > 60 ml/min  01/04/20  19:59    


 


BUN/Creatinine Ratio  16 %  01/04/20  19:59    


 


Glucose  124 mg/dL ()  H  01/04/20  19:59    


 


Calcium  9.9 mg/dL (8.4-10.2)   01/04/20  19:59    


 


Total Bilirubin  0.30 mg/dL (0.1-1.2)   01/04/20  19:59    


 


AST  20 units/L (5-40)   01/04/20  19:59    


 


ALT  13 units/L (7-56)   01/04/20  19:59    


 


Alkaline Phosphatase  89 units/L ()   01/04/20  19:59    


 


Total Creatine Kinase  65 units/L ()   01/06/20  06:43    


 


CK-MB (CK-2)  6.9 ng/mL (0.0-4.0)  H  01/06/20  06:43    


 


CK-MB (CK-2) Rel Index  10.6  (0-4)  H  01/06/20  06:43    


 


Troponin T  < 0.010 ng/mL (0.00-0.029)   01/06/20  06:43    


 


Total Protein  7.5 g/dL (6.3-8.2)   01/04/20  19:59    


 


Albumin  4.0 g/dL (3.9-5)   01/04/20  19:59    


 


Albumin/Globulin Ratio  1.1 %  01/04/20  19:59    


 


Triglycerides  117 mg/dL (2-149)   01/05/20  08:52    


 


Cholesterol  146 mg/dL ()   01/05/20  08:52    


 


LDL Cholesterol Direct  66 mg/dL ()   01/05/20  08:52    


 


HDL Cholesterol  70 mg/dL (40-59)  H  01/05/20  08:52    


 


Cholesterol/HDL Ratio  2.08 %  01/05/20  08:52    














Active Medications





- Current Medications


Current Medications: 














Generic Name Dose Route Start Last Admin





  Trade Name Freq  PRN Reason Stop Dose Admin


 


Acetaminophen  650 mg  01/04/20 23:41  01/07/20 11:36





  Tylenol  PO   650 mg





  Q4H PRN   Administration





  Pain, Mild (1-3)  


 


Aspirin  325 mg  01/05/20 10:00  01/07/20 09:40





  Aspirin  PO   325 mg





  QDAY [facility]   Administration


 


Atorvastatin Calcium  40 mg  01/05/20 22:00  01/06/20 22:19





  Lipitor  PO   40 mg





  [facility]HS [facility]   Administration


 


Bisacodyl  10 mg  01/04/20 23:41 





  Dulcolax  IA  





  QDAY PRN  





  Constipation  


 


Enoxaparin Sodium  40 mg  01/05/20 22:00  01/06/20 22:18





  Enoxaparin  SUB-Q   Not Given





  QDAY@2200 [facility]  


 


Hydralazine HCl  5 mg  01/04/20 23:43  01/07/20 19:36





  Apresoline  IV   5 mg





  Q6H PRN   Administration





  Hypertension  


 


Magnesium Hydroxide  30 ml  01/04/20 23:41 





  Milk Of Magnesia  PO  





  Q4H PRN  





  Constipation  


 


Metoprolol Tartrate  50 mg  01/07/20 22:00 





  Metoprolol  PO  





  BID [facility]  


 


Morphine Sulfate  2 mg  01/06/20 02:01 





  Morphine  IV  





  Q4H PRN  





  Pain, Moderate (4-6)  


 


Nitroglycerin  0.4 mg  01/06/20 02:01 





  Nitrostat  SL  





  .Q5MIN PRN  





  Chest Pain  


 


Ondansetron HCl  4 mg  01/04/20 23:41  01/05/20 02:28





  Zofran  IV   4 mg





  Q8H PRN   Administration





  Nausea And Vomiting  


 


Sodium Chloride  10 ml  01/04/20 23:41  01/05/20 21:28





  Sodium Chloride Flush Syringe 10 Ml  IV   10 ml





  PRN PRN   Administration





  LINE FLUSH

## 2020-01-07 NOTE — PROGRESS NOTE
Assessment and Plan





Transient Afib


   initiated on beta blockers for suppression


Acute CVA


Hypertension


Nonobstructive CAD by [facility] 11/2018





Normal LVEF 65-70% by echo this admission. Bubble study is negative.





Continue beta blockers for suppression of paroxysmal afib. We will increase the 

dosage of metoprolol to 50mg twice daily.


Initiate oral anticoagulation therapy as per recommendations of neurology.





Subjective


Date of service: 01/07/20


Interval history: 





Stable sinus rhythm on telemetry.


No events seen on telemetry overnight.





Objective


                                   Vital Signs











  Temp Pulse Pulse Pulse Pulse Resp BP


 


 01/07/20 09:40   105 H      179/78


 


 01/07/20 08:42  98.4 F  99 H     18  154/97


 


 01/07/20 04:03  98.0 F  89     18  151/71


 


 01/07/20 01:49  97.9 F      


 


 01/07/20 01:48   102 H     18  152/90


 


 01/06/20 22:20   87  87  87  87  18  190/77


 


 01/06/20 21:30   90     


 


 01/06/20 20:04  97.7 F  87     18  190/72


 


 01/06/20 18:12  97.7 F  84     18  162/73


 


 01/06/20 17:00   90     


 


 01/06/20 13:43  98.3 F  78     18  150/78


 


 01/06/20 12:00   95 H     


 


 01/06/20 11:12   94 H      150/75














  Pulse Ox


 


 01/07/20 09:40 


 


 01/07/20 08:42  96


 


 01/07/20 04:03  99


 


 01/07/20 01:49 


 


 01/07/20 01:48  93


 


 01/06/20 22:20  96


 


 01/06/20 21:30 


 


 01/06/20 20:04  96


 


 01/06/20 18:12  97


 


 01/06/20 17:00 


 


 01/06/20 13:43  97


 


 01/06/20 12:00 


 


 01/06/20 11:12  98














- Physical Examination


General: No Apparent Distress


HEENT: Positive: PERRL


Neck: Positive: trachea midline


Cardiac: Positive: Reg Rate and Rhythm


Lungs: Positive: Decreased Breath Sounds


Neuro: Positive: Grossly Intact

## 2020-01-08 RX ADMIN — LISINOPRIL SCH MG: 20 TABLET ORAL at 10:37

## 2020-01-08 RX ADMIN — ENOXAPARIN SODIUM SCH: 100 INJECTION SUBCUTANEOUS at 22:22

## 2020-01-08 RX ADMIN — METOPROLOL TARTRATE SCH MG: 50 TABLET, FILM COATED ORAL at 22:22

## 2020-01-08 RX ADMIN — HYDRALAZINE HYDROCHLORIDE PRN MG: 20 INJECTION INTRAMUSCULAR; INTRAVENOUS at 07:48

## 2020-01-08 RX ADMIN — ASPIRIN SCH MG: 325 TABLET ORAL at 10:37

## 2020-01-08 RX ADMIN — METOPROLOL TARTRATE SCH MG: 50 TABLET, FILM COATED ORAL at 10:37

## 2020-01-08 NOTE — PROGRESS NOTE
Assessment and Plan





Paroxysmal Afib, spontaneously reverted to sinus rhythm


   on beta blockers for suppression


Acute CVA


Hypertension


Nonobstructive CAD by [facility] 11/2018





Normal LVEF 65-70% by echo this admission. Bubble study is negative.





Recommendations:


Continue metoprolol for suppression of paroxysmal afib.


In addition, she needs long-term oral anticoagulation for paroxysmal atrial 

fibrillation and history of CVA.


The timing of initiation of oral anticoagulation will depend on recommendations 

from neurology.





Subjective


Date of service: 01/08/20


Interval history: 





Stable sinus rhythm on telemetry.


No events seen on telemetry overnight.





Objective


                                   Vital Signs











  Temp Pulse Pulse Pulse Pulse Resp BP


 


 01/08/20 07:48   87      192/122


 


 01/08/20 07:27  97.7 F  87     18  192/122


 


 01/08/20 04:08  98.1 F  68     18  181/85


 


 01/08/20 00:26  98.3 F  89     18  172/84


 


 01/07/20 23:45       20 


 


 01/07/20 22:45       20 


 


 01/07/20 22:00   128 H     


 


 01/07/20 21:43   107 H     22  192/69


 


 01/07/20 19:36   86      183/78


 


 01/07/20 19:25  98.1 F  87     18  173/74


 


 01/07/20 18:39   42 L      183/78


 


 01/07/20 17:00   85     


 


 01/07/20 11:34   82      149/64


 


 01/07/20 11:30  98.0 F  82     19 


 


 01/07/20 10:37   87      179/78


 


 01/07/20 10:00   76  87  87  87  19 


 


 01/07/20 09:40   105 H      179/78














  BP Pulse Ox


 


 01/08/20 07:48  


 


 01/08/20 07:27   98


 


 01/08/20 04:08   99


 


 01/08/20 00:26   99


 


 01/07/20 23:45  


 


 01/07/20 22:45  


 


 01/07/20 22:00  


 


 01/07/20 21:43   100


 


 01/07/20 19:36  


 


 01/07/20 19:25   95


 


 01/07/20 18:39   83 L


 


 01/07/20 17:00  


 


 01/07/20 11:34   96


 


 01/07/20 11:30  149/64 


 


 01/07/20 10:37   71 L


 


 01/07/20 10:00   100


 


 01/07/20 09:40  














- Physical Examination


General: No Apparent Distress


HEENT: Positive: PERRL


Neck: Positive: trachea midline


Cardiac: Positive: Reg Rate and Rhythm


Lungs: Positive: Decreased Breath Sounds


Neuro: Positive: Grossly Intact


Extremities: Absent: edema

## 2020-01-08 NOTE — PROGRESS NOTE
Subjective


Date of service: 01/08/20


Interval history: 


spoke to Dr. BARNES  I would start loww dose eliquis po tomorrow  Thursday for 

embolic stroke atrial fibrillation  did review Cardiology note  Thanks








Objective





- Vital Sign


                               Vital Signs - 12hr











  01/08/20 01/08/20 01/08/20





  07:27 07:48 09:35


 


Temperature 97.7 F  


 


Pulse Rate 87 87 87


 


Respiratory 18  





Rate   


 


Blood Pressure 192/122 192/122 


 


O2 Sat by Pulse 98  





Oximetry   














  01/08/20 01/08/20





  09:36 10:37


 


Temperature  


 


Pulse Rate  96 H


 


Respiratory  





Rate  


 


Blood Pressure  166/74


 


O2 Sat by Pulse 98 





Oximetry  














- Laboratory Findings


CBC and BMP: 


                                 01/04/20 19:59





                                 01/04/20 19:59


Abnormal Lab Findings: 


                                  Abnormal Labs











  01/04/20 01/04/20 01/05/20





  19:59 19:59 08:52


 


RBC  5.25 H  


 


Hgb  15.6 H  


 


Hct  47.2 H  


 


Lymph % (Auto)  37.6 H  


 


Glucose   124 H 


 


CK-MB (CK-2)   


 


CK-MB (CK-2) Rel Index   


 


HDL Cholesterol    70 H














  01/06/20 01/06/20





  03:01 06:43


 


RBC  


 


Hgb  


 


Hct  


 


Lymph % (Auto)  


 


Glucose  


 


CK-MB (CK-2)  7.0 H  6.9 H


 


CK-MB (CK-2) Rel Index  9.0 H  10.6 H


 


HDL Cholesterol

## 2020-01-08 NOTE — PROGRESS NOTE
Assessment and Plan


Assessment and plan: 


--Acute CVA[small subacute infarct right frontal area]


Not a candidate for TPA, Aspirin and statin


Neurology following, PT OT rehabilitation


Lipid panel, permissive hypertension





Neuro workup so far;


MRI small foci of surgical increased diffusion signal along the right maxilla 

and frontal 


subcortical lesions may reflect small subacute infarct


Persistent advanced cerebral atrophy involving the frontal and temporal


MRA:: Mild to moderate focal stenosis involving the left MCA.  Trifurcation


CT head without contrast; no hemorrhoidsstroke


Echo; LDH ,EF 65-70%,





--New Onset atrial fibrillation;


Rate controlled, cardiology following


Continue beta blockers


Anticoagulation per neurology recommendations





--Hypertension; controlled


Continue current antihypertensives and when necessary medications





--Dyslipidemia; statin





--Obesity; BMI 34.6


Advised weight reduction and medically stable





--DVT prophylaxis; Lovenox





Monitor clinically and adjust management as needed


Plan of care is reviewed with the patient and her nurse





Disposition; follow neurology recommendations for anticoagulation


Discharge when medically stable








History:


Patient is a 74-year-old  female with past medical history of 

hypertension and TIAs in the past who is complaining of some left arm weakness 

and slurred speech earlier today.  Patient states when she woke this morning 

approximately 8 AM she has some left arm weakness and heaviness.  Patient states

within the hour after waking the symptoms resolved.  Around 2 PM patient was on 

the phone with a friend telling her about the weakness she had that morning and 

her friend noticed that she has slurred speech.  Patient states this also 

resolved spontaneously as well.  


Was admitted to the hospital for stroke protocol, not a candidate for TPA




















History


Interval history: 


Patient feels better and no neurologic deficits


Denies any headache or dizziness


Denies chest pain or shortness of breath


Vital signs reviewed








Hospitalist Physical





- Constitutional


Vitals: 


                                        











Temp Pulse Resp BP Pulse Ox


 


 98.3 F   81   18   140/63   98 


 


 01/08/20 19:23  01/08/20 19:23  01/08/20 19:23  01/08/20 19:23  01/08/20 19:23











General appearance: Present: no acute distress, well-nourished





- EENT


Eyes: Present: PERRL, EOM intact





- Neck


Neck: Present: supple, normal ROM





- Respiratory


Respiratory effort: normal


Respiratory: bilateral: diminished, negative: rales, rhonchi, wheezing





- Cardiovascular


Rhythm: regular


Heart Sounds: Present: S1 & S2





- Extremities


Extremities: no ischemia, No edema





- Abdominal


General gastrointestinal: soft, non-tender, non-distended, normal bowel sounds





- Integumentary


Integumentary: Present: clear, warm





- Psychiatric


Psychiatric: appropriate mood/affect, cooperative





- Neurologic


Neurologic: CNII-XII intact, moves all extremities





Results





- Labs


CBC & Chem 7: 


                                 01/04/20 19:59





                                 01/04/20 19:59


Labs: 


                             Laboratory Last Values











WBC  10.2 K/mm3 (4.5-11.0)   01/04/20  19:59    


 


RBC  5.25 M/mm3 (3.65-5.03)  H  01/04/20  19:59    


 


Hgb  15.6 gm/dl (10.1-14.3)  H  01/04/20  19:59    


 


Hct  47.2 % (30.3-42.9)  H  01/04/20  19:59    


 


MCV  90 fl (79-97)   01/04/20  19:59    


 


MCH  30 pg (28-32)   01/04/20  19:59    


 


MCHC  33 % (30-34)   01/04/20  19:59    


 


RDW  14.2 % (13.2-15.2)   01/04/20  19:59    


 


Plt Count  225 K/mm3 (140-440)   01/04/20  19:59    


 


Lymph % (Auto)  37.6 % (13.4-35.0)  H  01/04/20  19:59    


 


Mono % (Auto)  7.2 % (0.0-7.3)   01/04/20  19:59    


 


Eos % (Auto)  2.5 % (0.0-4.3)   01/04/20  19:59    


 


Baso % (Auto)  0.5 % (0.0-1.8)   01/04/20  19:59    


 


Lymph #  3.8 K/mm3 (1.2-5.4)   01/04/20  19:59    


 


Mono #  0.7 K/mm3 (0.0-0.8)   01/04/20  19:59    


 


Eos #  0.3 K/mm3 (0.0-0.4)   01/04/20  19:59    


 


Baso #  0.1 K/mm3 (0.0-0.1)   01/04/20  19:59    


 


Seg Neutrophils %  52.2 % (40.0-70.0)   01/04/20  19:59    


 


Seg Neutrophils #  5.3 K/mm3 (1.8-7.7)   01/04/20  19:59    


 


Sodium  143 mmol/L (137-145)   01/04/20  19:59    


 


Potassium  4.1 mmol/L (3.6-5.0)   01/04/20  19:59    


 


Chloride  102.1 mmol/L ()   01/04/20  19:59    


 


Carbon Dioxide  26 mmol/L (22-30)   01/04/20  19:59    


 


Anion Gap  19 mmol/L  01/04/20  19:59    


 


BUN  11 mg/dL (7-17)   01/04/20  19:59    


 


Creatinine  0.7 mg/dL (0.7-1.2)   01/04/20  19:59    


 


Estimated GFR  > 60 ml/min  01/04/20  19:59    


 


BUN/Creatinine Ratio  16 %  01/04/20  19:59    


 


Glucose  124 mg/dL ()  H  01/04/20  19:59    


 


Calcium  9.9 mg/dL (8.4-10.2)   01/04/20  19:59    


 


Total Bilirubin  0.30 mg/dL (0.1-1.2)   01/04/20  19:59    


 


AST  20 units/L (5-40)   01/04/20  19:59    


 


ALT  13 units/L (7-56)   01/04/20  19:59    


 


Alkaline Phosphatase  89 units/L ()   01/04/20  19:59    


 


Total Creatine Kinase  65 units/L ()   01/06/20  06:43    


 


CK-MB (CK-2)  6.9 ng/mL (0.0-4.0)  H  01/06/20  06:43    


 


CK-MB (CK-2) Rel Index  10.6  (0-4)  H  01/06/20  06:43    


 


Troponin T  < 0.010 ng/mL (0.00-0.029)   01/06/20  06:43    


 


Total Protein  7.5 g/dL (6.3-8.2)   01/04/20  19:59    


 


Albumin  4.0 g/dL (3.9-5)   01/04/20  19:59    


 


Albumin/Globulin Ratio  1.1 %  01/04/20  19:59    


 


Triglycerides  117 mg/dL (2-149)   01/05/20  08:52    


 


Cholesterol  146 mg/dL ()   01/05/20  08:52    


 


LDL Cholesterol Direct  66 mg/dL ()   01/05/20  08:52    


 


HDL Cholesterol  70 mg/dL (40-59)  H  01/05/20  08:52    


 


Cholesterol/HDL Ratio  2.08 %  01/05/20  08:52    














Active Medications





- Current Medications


Current Medications: 














Generic Name Dose Route Start Last Admin





  Trade Name Freq  PRN Reason Stop Dose Admin


 


Acetaminophen  650 mg  01/04/20 23:41  01/07/20 22:45





  Tylenol  PO   650 mg





  Q4H PRN   Administration





  Pain, Mild (1-3)  


 


Aspirin  325 mg  01/05/20 10:00  01/08/20 10:37





  Aspirin  PO   325 mg





  QDAY [facility]   Administration


 


Atorvastatin Calcium  40 mg  01/05/20 22:00  01/07/20 22:45





  Lipitor  PO   40 mg





  [facility]HS [facility]   Administration


 


Bisacodyl  10 mg  01/04/20 23:41 





  Dulcolax  OH  





  QDAY PRN  





  Constipation  


 


Diphenhydramine HCl  25 mg  01/07/20 22:48  01/08/20 07:48





  Benadryl  IV   25 mg





  Q6H PRN   Administration





  Itching  


 


Enoxaparin Sodium  40 mg  01/05/20 22:00  01/07/20 22:00





  Enoxaparin  SUB-Q   Not Given





  QDAY@2200 [facility]  


 


Lisinopril  20 mg  01/08/20 11:00  01/08/20 10:37





  Zestril  PO   20 mg





  QDAY [facility]   Administration


 


Magnesium Hydroxide  30 ml  01/04/20 23:41 





  Milk Of Magnesia  PO  





  Q4H PRN  





  Constipation  


 


Metoprolol Tartrate  50 mg  01/07/20 22:00  01/08/20 10:37





  Metoprolol  PO   50 mg





  BID [facility]   Administration


 


Morphine Sulfate  2 mg  01/06/20 02:01 





  Morphine  IV  





  Q4H PRN  





  Pain, Moderate (4-6)  


 


Nitroglycerin  0.4 mg  01/06/20 02:01 





  Nitrostat  SL  





  .Q5MIN PRN  





  Chest Pain  


 


Ondansetron HCl  4 mg  01/04/20 23:41  01/05/20 02:28





  Zofran  IV   4 mg





  Q8H PRN   Administration





  Nausea And Vomiting  


 


Sodium Chloride  10 ml  01/04/20 23:41  01/05/20 21:28





  Sodium Chloride Flush Syringe 10 Ml  IV   10 ml





  PRN PRN   Administration





  LINE FLUSH

## 2020-01-09 VITALS — SYSTOLIC BLOOD PRESSURE: 173 MMHG | DIASTOLIC BLOOD PRESSURE: 80 MMHG

## 2020-01-09 RX ADMIN — ASPIRIN SCH MG: 325 TABLET ORAL at 10:35

## 2020-01-09 RX ADMIN — LISINOPRIL SCH MG: 20 TABLET ORAL at 10:35

## 2020-01-09 RX ADMIN — METOPROLOL TARTRATE SCH MG: 50 TABLET, FILM COATED ORAL at 10:35

## 2020-01-09 NOTE — DISCHARGE SUMMARY
Providers





- Providers


Date of Admission: 


01/06/20 09:18





Attending physician: 


GOVIND SUTHERLAND





                                        





01/04/20


Consult to Physician [CONS] Routine 


   Comment: 


   Consulting Provider: SCOOBY BOOTH


   Physician Instructions: 


   Reason For Exam: tia





01/04/20 23:41


Occupational Therapy Evaluate and Treat [CONS] Routine 


   Comment: 


   Reason For Exam: Neuro deficits


Physical Therapy Evaluation and Treat [CONS] Routine 


   Comment: 


   Reason For Exam: Neuro deficits





01/06/20 02:00


Consult to Physician [CONS] Routine 


   Comment: 


   Consulting Provider: CYNTHIA WILDER


   Physician Instructions: 


   Reason For Exam: ST with irregular rate, c/o chest pressure











Primary care physician: 


PRIMARY CARE MD








Hospitalization


Condition: Stable


Hospital course: 





Discontinue Plavix.


Disposition: DC-30 STILL A PATIENT





Exam





- Constitutional


Vitals: 


                                        











Temp Pulse Resp BP Pulse Ox


 


 97.0 F L  67   18   121/58   98 


 


 01/09/20 11:50  01/09/20 11:50  01/09/20 11:50  01/09/20 11:50  01/09/20 11:50














Plan


Follow up with: 


PRIMARY CARE,MD [Primary Care Provider] - 7 Days


Prescriptions: 


Aspirin [Aspirin BABY CHEW TAB] 81 mg PO QDAY #30 tab.chew


Apixaban [Eliquis] 2.5 mg PO BID #60 tablet

## 2020-01-09 NOTE — PROGRESS NOTE
Assessment and Plan





Paroxysmal Afib, spontaneously reverted to sinus rhythm


   on beta blockers for suppression


Acute CVA


Hypertension


Nonobstructive CAD by [facility] 11/2018





Normal LVEF 65-70% by echo this admission. Bubble study is negative.





Recommendations:


Continue metoprolol for suppression of paroxysmal afib.


Patient needs long-term oral anticoagulation for paroxysmal atrial fibrillation 

and history of CVA. Neurology recommends low dose Eliquis, to initiate today. 


Stable cardiac wise for discharge. Patient advised to follow up with Dr Peng, 

her primary cardiologist in 3-5 days.





Subjective


Date of service: 01/09/20


Interval history: 





Neurology recommends starting low dose Eliquis today.


Stable sinus rhythm on telemetry.


No events seen on telemetry overnight.





Objective


                                   Vital Signs











  Temp Pulse Resp BP BP Pulse Ox


 


 01/09/20 08:08  97.3 F L  82  20    99


 


 01/09/20 08:00  97.3 F L  82  18   139/77  98


 


 01/09/20 04:06  98.1 F  72  18  152/67   98


 


 01/08/20 23:12  98.8 F  78  18  164/72   96


 


 01/08/20 22:00   64    


 


 01/08/20 19:23  98.3 F  81  18  140/63   98


 


 01/08/20 17:48   84   169/75   98


 


 01/08/20 13:21  97.1 F L  72  18  143/73   97


 


 01/08/20 10:39    20  166/74  


 


 01/08/20 10:37   96 H   166/74  














- Physical Examination


General: No Apparent Distress


HEENT: Positive: PERRL


Neck: Positive: trachea midline


Cardiac: Positive: Reg Rate and Rhythm


Lungs: Positive: Decreased Breath Sounds


Neuro: Positive: Grossly Intact


Extremities: Absent: edema

## 2021-04-30 ENCOUNTER — HOSPITAL ENCOUNTER (EMERGENCY)
Dept: HOSPITAL 5 - ED | Age: 76
Discharge: OTHER INSTITUTION | End: 2021-04-30
Payer: MEDICARE

## 2021-04-30 VITALS — SYSTOLIC BLOOD PRESSURE: 111 MMHG | DIASTOLIC BLOOD PRESSURE: 43 MMHG

## 2021-04-30 DIAGNOSIS — I61.9: ICD-10-CM

## 2021-04-30 DIAGNOSIS — I10: ICD-10-CM

## 2021-04-30 DIAGNOSIS — J96.90: Primary | ICD-10-CM

## 2021-04-30 DIAGNOSIS — Z79.899: ICD-10-CM

## 2021-04-30 DIAGNOSIS — R41.82: ICD-10-CM

## 2021-04-30 LAB
APTT BLD: 25.2 SEC. (ref 24.2–36.6)
BASOPHILS # (AUTO): 0 K/MM3 (ref 0–0.1)
BASOPHILS NFR BLD AUTO: 0.2 % (ref 0–1.8)
BUN SERPL-MCNC: 14 MG/DL (ref 7–17)
BUN/CREAT SERPL: 23 %
CALCIUM SERPL-MCNC: 8.7 MG/DL (ref 8.4–10.2)
EOSINOPHIL # BLD AUTO: 0 K/MM3 (ref 0–0.4)
EOSINOPHIL NFR BLD AUTO: 0.4 % (ref 0–4.3)
HCT VFR BLD CALC: 41.6 % (ref 30.3–42.9)
HEMOLYSIS INDEX: 37
HGB BLD-MCNC: 13.6 GM/DL (ref 10.1–14.3)
INR PPP: 1.04 (ref 0.87–1.13)
LYMPHOCYTES # BLD AUTO: 1.2 K/MM3 (ref 1.2–5.4)
LYMPHOCYTES NFR BLD AUTO: 13.9 % (ref 13.4–35)
MCHC RBC AUTO-ENTMCNC: 33 % (ref 30–34)
MCV RBC AUTO: 90 FL (ref 79–97)
MONOCYTES # (AUTO): 0.4 K/MM3 (ref 0–0.8)
MONOCYTES % (AUTO): 4.5 % (ref 0–7.3)
PLATELET # BLD: 223 K/MM3 (ref 140–440)
RBC # BLD AUTO: 4.65 M/MM3 (ref 3.65–5.03)

## 2021-04-30 PROCEDURE — 80048 BASIC METABOLIC PNL TOTAL CA: CPT

## 2021-04-30 PROCEDURE — 36415 COLL VENOUS BLD VENIPUNCTURE: CPT

## 2021-04-30 PROCEDURE — 96365 THER/PROPH/DIAG IV INF INIT: CPT

## 2021-04-30 PROCEDURE — 82553 CREATINE MB FRACTION: CPT

## 2021-04-30 PROCEDURE — 85610 PROTHROMBIN TIME: CPT

## 2021-04-30 PROCEDURE — 85730 THROMBOPLASTIN TIME PARTIAL: CPT

## 2021-04-30 PROCEDURE — 85670 THROMBIN TIME PLASMA: CPT

## 2021-04-30 PROCEDURE — 99291 CRITICAL CARE FIRST HOUR: CPT

## 2021-04-30 PROCEDURE — 85025 COMPLETE CBC W/AUTO DIFF WBC: CPT

## 2021-04-30 PROCEDURE — 36680 INSERT NEEDLE BONE CAVITY: CPT

## 2021-04-30 PROCEDURE — 82550 ASSAY OF CK (CPK): CPT

## 2021-04-30 PROCEDURE — 71045 X-RAY EXAM CHEST 1 VIEW: CPT

## 2021-04-30 PROCEDURE — 94002 VENT MGMT INPAT INIT DAY: CPT

## 2021-04-30 PROCEDURE — 82962 GLUCOSE BLOOD TEST: CPT

## 2021-04-30 PROCEDURE — 96375 TX/PRO/DX INJ NEW DRUG ADDON: CPT

## 2021-04-30 PROCEDURE — 99292 CRITICAL CARE ADDL 30 MIN: CPT

## 2021-04-30 PROCEDURE — 70450 CT HEAD/BRAIN W/O DYE: CPT

## 2021-04-30 PROCEDURE — 84484 ASSAY OF TROPONIN QUANT: CPT

## 2021-04-30 PROCEDURE — 31500 INSERT EMERGENCY AIRWAY: CPT

## 2021-04-30 NOTE — XRAY REPORT
CHEST 1 VIEW 4/30/2021 5:17 AM



INDICATION / CLINICAL INFORMATION: ETT placement.



COMPARISON: 01/04/20



FINDINGS:



SUPPORT DEVICES: There is an endotracheal tube with the tip approximately 4 cm above the susie. The 
tip of the nasogastric tube overlies the distal stomach or duodenal bulb.

HEART / MEDIASTINUM: The heart size and pulmonary vasculature are normal. 

LUNGS / PLEURA: No significant pulmonary or pleural abnormality. No pneumothorax. 



ADDITIONAL FINDINGS: No significant additional findings.



IMPRESSION:

1. ET tube and NG tube in satisfactory position radiographically. 

2. No acute abnormality.



Signer Name: Hal Chavarria MD 

Signed: 4/30/2021 5:47 AM

Workstation Name: [email or web address]

## 2021-04-30 NOTE — CAT SCAN REPORT
CT HEAD WITHOUT CONTRAST



INDICATION / CLINICAL INFORMATION: CODE STROKE PROTOCOL!!  Stroke-Like Symptoms, Unresponsive.



TECHNIQUE: All CT scans at this location are performed using CT dose reduction for ALARA by means of 
automated exposure control. 



COMPARISON: 01/04/20.



FINDINGS:

HEMORRHAGE: There is a large area of acute hemorrhage involving the mid to upper juancarlos and the lower m
idbrain. The hemorrhage measures approximately 4.4 cm in greatest dimension. The hemorrhage extends p
osteriorly on the left into the fourth ventricle.

EXTRA-AXIAL SPACES: Moderately prominent likely related to cortical atrophy.

VENTRICULAR SYSTEM: Moderately enlarged likely related to central atrophy. Mildly dilated temporal ho
rns.

CEREBRAL PARENCHYMA: Extensive small vessel ischemic changes in the periventricular white matter and 
gangliocapsular regions bilaterally. No acute territorial infarct. 

MIDLINE SHIFT / HERNIATION: None.

CEREBELLUM / BRAINSTEM: No significant abnormality.



ORBITS: Normal as visualized.

SOFT TISSUES: No significant abnormality.

SKULL: No significant abnormality.

PARANASAL SINUSES / MASTOID AIR CELLS: Normal as visualized.



ADDITIONAL FINDINGS: None.



IMPRESSION: Large acute parenchymal hemorrhage involving the brainstem. There is intraventricular ext
ension into the fourth ventricle with evidence of early hydrocephalus.







============================================

CODE STROKE

Time of Communication (CST/CDT): 4:29 AM

Licensed Practitioner Receiving Report: Dr. Mojica 





============================================



Signer Name: Hal Chavarria MD 

Signed: 4/30/2021 5:33 AM

Workstation Name: [id number]

## 2021-04-30 NOTE — CONSULTATION
History of Present Illness


History of present illness: 





[facility] Consult Note





# Demographics


Consult Type: Acute Stroke Level 1 (0-4.5 hrs)


Patient Location: Emergency Room


First Name: Trista


Last Name: Payton


YOB: 1945


Age: 75


Gender: Female


Time of Initial Page (Eastern Time): 04/30/2021, 04:50


Time of Return Call (Eastern Time): 04/30/2021, 04:51





# HPI


Chief Complaint: unresponsiveness


History: 75F with stroke 1 month prior presents with unresponsivness and 

possible seizure activity. On arrival to ED, started to become slightly more 

responsive but not protecting airway, so intubated for airway protection. BP 

220/140s prior to intubation, then down to 160s/110s.





# Scores


Time of exam and NIHSS (Eastern Time): 04/30/2021, 05:17


Level of Consciousness 1a: [3] = Responds only with reflex motor or unresponsive


LOC Questions 1b: [2] = Answers neither correctly


LOC Commands 1c: [2] = Performs neither correctly


Best Gaze 2: [2] = Forced deviation


Visual 3: [3] = Bilateral hemianopia


Facial Palsy 4: [3] = Complete paralysis


Motor Arm Left 5a: [3] = No effort against gravity


Motor Arm Right 5b: [3] = No effort against gravity


Motor Leg Left 6a: [4] = No movement


Motor Leg Right 6b: [4] = No movement


Limb Ataxia 7: [0] = Absent


Sensory 8: [2] = Severe to total sensory loss


Best Language 9: [3] = Mute


Dysarthria 10: [0] UN = Intubated or other physical barrier


Extinction and Inattention 11: [0] = No abnormality


NIHSS Total: 34


ICH Score: [1] = GCS 5-12, [0] = age < 80, [0] = ICH vol < 30 mL, [1] = 

intraventricular hemorrhage, [1] = infratentorial origin of hemorrhage


ICH Score total: 3





# Data


Time Head CT personally read by me (Eastern Time): 04/30/2021, 05:18


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





# Assessment


Impression: Hemorrhagic Stroke, IVH





# Plan


Thrombolytic/Intervention: NOT IV Thrombolytic or IA Intervention


Thrombolytic Exclusion (< 3 hour window): ICH


Intraarterial Exclusion: ICH


Target Blood Pressure: SBP < 160


Medication: mannitol 100g


Additional Recommendations: transfer for possible neurosurgical intervention


HOB > 30 degrees


Disposition: transfer





# Logistics


Telemedicine: Interactive 2 way audio and visual telecommunication technology 

was utilized during this visit





Electronically signed at 04/30/2021 05:20 (Eastern Time) by Lon Pina MD





Medications and Allergies


                                    Allergies











Allergy/AdvReac Type Severity Reaction Status Date / Time


 


meperidine HCl [From Demerol] Allergy  Vomiting Verified 01/04/20 23:56











                                Home Medications











 Medication  Instructions  Recorded  Confirmed  Last Taken  Type


 


Amitriptyline [Elavil] 75 mg PO QHS 01/06/20 01/06/20 Unknown History


 


Atenolol [Tenormin] 100 mg PO BID 01/06/20 01/06/20 Unknown History


 


DULoxetine 30 mg PO ONCE 01/06/20 01/06/20 Unknown History


 


Isosorbide Mononitrate 10 mg PO ONCE 01/06/20 01/06/20 Unknown History


 


Apixaban [Eliquis] 2.5 mg PO BID #60 tablet 01/09/20  Unknown Rx


 


Aspirin [Aspirin BABY CHEW TAB] 81 mg PO QDAY #30 tab.chew 01/09/20  Unknown Rx











Active Meds: 


Active Medications





Hydrophilic Ointment (Lip Therapy Vaseline)  1 applic TP Q2HR PRN


   PRN Reason: Dry Lips


Propofol (Diprivan 10 Mg/Ml)  1,000 mg in 100 mls @ 3.402 mls/hr IV TITR CHRISTIAN; 

Protocol


Nicardipine HCl 50 mg/ Sodium (Chloride)  250 mls @ 25 mls/hr IV TITR CHRISTIAN; 

Protocol


Mannitol (Mannitol 12.5 Gm/50 Ml (25%) Vial)  100 gm IV ONCE ONE


   Stop: 04/30/21 05:19


Multi-Ingred Cream/Lotion/Oil/Oint (Mineral Oil/Petrolatum, White Ophth Oint 3.5

Gm)  1 applic OU Q4HR PRN


   PRN Reason: Dry Eye(s)

## 2021-04-30 NOTE — EMERGENCY DEPARTMENT REPORT
ED Altered Mental Status HPI





- General


Chief Complaint: Altered Mental Status


Stated Complaint: RESPIRATORY DISTRESS


PUI?: No


Time Seen by Provider: 04/30/21 04:44


Source: EMS





- History of Present Illness


Initial Comments: 





Patient is a 75-year-old female that presents emergency room with altered mental

status and unresponsiveness.  Patient's last known well time was 20 minutes 

prior to arrival.  Patient brought in by EMS.  Patient's family called EMS.  

Patient had a stroke approximately 4 weeks ago with right-sided weakness.


MD Complaint: altered mental status, decreased responsiveness


-: Sudden


Severity: severe


Consistency of Symptoms: constant





- Related Data


                                Home Medications











 Medication  Instructions  Recorded  Confirmed  Last Taken


 


Amitriptyline [Elavil] 75 mg PO QHS 01/06/20 01/06/20 Unknown


 


Atenolol [Tenormin] 100 mg PO BID 01/06/20 01/06/20 Unknown


 


DULoxetine 30 mg PO ONCE 01/06/20 01/06/20 Unknown


 


Isosorbide Mononitrate 10 mg PO ONCE 01/06/20 01/06/20 Unknown








                                  Previous Rx's











 Medication  Instructions  Recorded  Last Taken  Type


 


Apixaban [Eliquis] 2.5 mg PO BID #60 tablet 01/09/20 Unknown Rx


 


Aspirin [Aspirin BABY CHEW TAB] 81 mg PO QDAY #30 tab.chew 01/09/20 Unknown Rx











                                    Allergies











Allergy/AdvReac Type Severity Reaction Status Date / Time


 


meperidine HCl [From Demerol] Allergy  Vomiting Verified 01/04/20 23:56














ED Review of Systems


ROS: 


Stated complaint: RESPIRATORY DISTRESS


Other details as noted in HPI





Comment: Unobtainable due to pts medical conditions





ED Past Medical Hx





- Past Medical History


Previous Medical History?: Yes


Hx Hypertension: Yes


Hx CVA: Yes


Additional medical history: TIA, Glycoma,





- Surgical History


Past Surgical History?: No





- Family History


Family history: no significant





- Social History


Smoking Status: Never Smoker


Substance Use Type: None





- Medications


Home Medications: 


                                Home Medications











 Medication  Instructions  Recorded  Confirmed  Last Taken  Type


 


Amitriptyline [Elavil] 75 mg PO QHS 01/06/20 01/06/20 Unknown History


 


Atenolol [Tenormin] 100 mg PO BID 01/06/20 01/06/20 Unknown History


 


DULoxetine 30 mg PO ONCE 01/06/20 01/06/20 Unknown History


 


Isosorbide Mononitrate 10 mg PO ONCE 01/06/20 01/06/20 Unknown History


 


Apixaban [Eliquis] 2.5 mg PO BID #60 tablet 01/09/20  Unknown Rx


 


Aspirin [Aspirin BABY CHEW TAB] 81 mg PO QDAY #30 tab.chew 01/09/20  Unknown Rx














ED Physical Exam





- General


Limitations: Altered Mental Status, Physical Limitation


General appearance: obtunded





- Head


Head exam: Present: atraumatic, normocephalic





- Eye


Eye exam: Present: normal appearance, PERRL


Pupils: Present: normal accommodation





- ENT


ENT exam: Present: mucous membranes dry





- Neck


Neck exam: Present: normal inspection





- Respiratory


Respiratory exam: Present: normal lung sounds bilaterally.  Absent: respiratory 

distress, wheezes, rales





- Cardiovascular


Cardiovascular Exam: Present: regular rate, normal rhythm.  Absent: systolic 

murmur, diastolic murmur, rubs, gallop





- GI/Abdominal


GI/Abdominal exam: Present: soft, normal bowel sounds.  Absent: distended, 

tenderness, guarding





- Extremities Exam


Extremities exam: Present: normal inspection





- Back Exam


Back exam: Present: normal inspection





- Neurological Exam


Neurological exam: Present: altered





- Skin


Skin exam: Present: warm, dry, intact, normal color.  Absent: rash





- Assessment


Assessment Interval: Baseline





- Level of Consciousness


1a. Level of Consciousness: coma/unresponsive





- LOC Questions


1b. LOC Questions: aphasic





- LOC Command


1c. LOC Commands: performs no tasks correctly





- Best Gaze


2. Best Gaze: normal





- Visual


3. Visual: no visual loss





- Facial Palsy


4. Facial Palsy: normal symmetrical movement





- Motor Arm


5a. Motor Arm Left: no movement


5b. Motor Arm Right: no movement





- Motor Leg


6a. Motor Leg Left: no movement


6b. Motor Leg Right: no movement





- Limb Ataxia


7. Limb Ataxia: absent





- Sensory


8. Sensory: normal





- Best Language


9. Best Language: coma/unresponsive





- Dysarthria


10. Dysarthria: intubated or other barrier





- Extinction and Inattention


11. Extinction/Inattention: no abnormality





- Scoring


Total Score: 26


Stroke Severity: Severe Stroke





ED Course


                                   Vital Signs











  04/30/21 04/30/21 04/30/21





  04:42 04:45 04:50


 


Pulse Rate 76 107 H 112 H


 


Respiratory 12 12 12





Rate   


 


Blood Pressure  259/140 259/140


 


O2 Sat by Pulse  100 100





Oximetry   














  04/30/21 04/30/21 04/30/21





  04:56 04:58 05:00


 


Pulse Rate 80  92 H


 


Respiratory 11 L 22 20





Rate   


 


Blood Pressure 259/140  259/140


 


O2 Sat by Pulse 100  100





Oximetry   














  04/30/21 04/30/21 04/30/21





  05:33 05:35 05:41


 


Pulse Rate 71 72 69


 


Respiratory 16 20 20





Rate   


 


Blood Pressure 259/140 259/140 259/140


 


O2 Sat by Pulse 99 100 100





Oximetry   














  04/30/21 04/30/21 04/30/21





  05:45 05:50 05:55


 


Pulse Rate 69 67 67


 


Respiratory 20 20 19





Rate   


 


Blood Pressure 146/87 161/85 158/84


 


O2 Sat by Pulse 100 100 100





Oximetry   














  04/30/21 04/30/21 04/30/21





  06:00 06:05 06:10


 


Pulse Rate 66 59 L 63


 


Respiratory 20 20 20





Rate   


 


Blood Pressure 173/91 173/91 169/79


 


O2 Sat by Pulse 100 100 100





Oximetry   














  04/30/21 04/30/21 04/30/21





  06:15 06:21 06:25


 


Pulse Rate 63 65 65


 


Respiratory 20 20 20





Rate   


 


Blood Pressure 167/80 208/79 235/105


 


O2 Sat by Pulse 100 100 100





Oximetry   














  04/30/21 04/30/21 04/30/21





  06:30 06:35 06:41


 


Pulse Rate 66 62 64


 


Respiratory 20 20 20





Rate   


 


Blood Pressure 209/86 179/71 153/65


 


O2 Sat by Pulse 100 100 100





Oximetry   














  04/30/21 04/30/21 04/30/21





  06:45 06:50 06:55


 


Pulse Rate 63 59 L 60


 


Respiratory 20 20 20





Rate   


 


Blood Pressure 126/55 121/52 111/51


 


O2 Sat by Pulse 100 100 100





Oximetry   














  04/30/21





  07:00


 


Pulse Rate 61


 


Respiratory 20





Rate 


 


Blood Pressure 111/43


 


O2 Sat by Pulse 100





Oximetry 














- Reevaluation(s)


Reevaluation #1: 


Initial valuation done.  Patient found to have agonal breathing and being bagged

by EMS.  Patient immediately intubated however the patient did not have IV 

access and an IO was placed.  Patient tolerated procedure well.  RSI used.  See 

procedure note.  Code stroke initiated immediately upon arrival.


04/30/21 04:54

















Reevaluation #2: 


Patient blood pressure improving.  Patient is sedated on ventilator.


04/30/21 05:20





Reevaluation #3: 


Patient will be transferred to another facility with neuro ICU. 


04/30/21 06:21








- Consultations


Consultation #1: 


I discussed CT scan with neurology and it is a pontine bleed.  Neurology 

recommends 1 g/kg of mannitol And blood pressure less than 160.


04/30/21 05:20











Consultation #2: 


I discussed the case with [facility] and the patient has been accepted to 

go to [facility].  The patient has been accepted by Dr. Villalpando.  Dr. Villalpando on recommends blood pressure below 140 systolically and Keppra 1 g.


04/30/21 05:50








Dr. Villalpando recommends Kcentra to reverse the Eliquis.


04/30/21 06:30








Consultation #3: 


I discussed case with our neurosurgery, Dr. Sanchez and they recommend transfer 

to a tertiary facility with neuro ICU.


04/30/21 06:05








- Intubation


Time Out Performed: Yes


Sedative: Etomidate


Paralytic: Succinylcholine


Laryngoscope: fiberoptic video scope


Size: 4


Assist Device Used: fiberoptic device


ET Tube Size: 7.5


Tube Secured Depth (cm): 22


Tube Secured Location: teeth


Tube Placement Confirmation: visualized tube passing t, equal breath sounds 

bilat, no breath sounds over epi, confirmation by capnometr


Patient Tolerated Procedure: well, no complications


Intubation Complications: none





- IO


  ** Left Tibia


Consent Obtained: emergent situation


Time Out Performed: Yes


IO Instrument Used to Penetrate the Cortex: battery powered IO drill


Patient Tolerated Procedure: well, no complications


Complications: none


Additional Comments: 





No complications.  Line flushed and withdrew well.  Sterile dressing applied.





- Lab Data


Result diagrams: 


                                 04/30/21 05:41





                                 04/30/21 05:41


                                   Lab Results











  04/30/21 04/30/21 04/30/21 Range/Units





  05:41 05:41 05:41 


 


WBC  8.6    (4.5-11.0)  K/mm3


 


RBC  4.65    (3.65-5.03)  M/mm3


 


Hgb  13.6    (10.1-14.3)  gm/dl


 


Hct  41.6    (30.3-42.9)  %


 


MCV  90    (79-97)  fl


 


MCH  29    (28-32)  pg


 


MCHC  33    (30-34)  %


 


RDW  13.8    (13.2-15.2)  %


 


Plt Count  223    (140-440)  K/mm3


 


Lymph % (Auto)  13.9    (13.4-35.0)  %


 


Mono % (Auto)  4.5    (0.0-7.3)  %


 


Eos % (Auto)  0.4    (0.0-4.3)  %


 


Baso % (Auto)  0.2    (0.0-1.8)  %


 


Lymph # (Auto)  1.2    (1.2-5.4)  K/mm3


 


Mono # (Auto)  0.4    (0.0-0.8)  K/mm3


 


Eos # (Auto)  0.0    (0.0-0.4)  K/mm3


 


Baso # (Auto)  0.0    (0.0-0.1)  K/mm3


 


Seg Neutrophils %  81.0 H    (40.0-70.0)  %


 


Seg Neutrophils #  7.0    (1.8-7.7)  K/mm3


 


PT   13.5   (12.2-14.9)  Sec.


 


INR   1.04   (0.87-1.13)  


 


APTT   25.2   (24.2-36.6)  Sec.


 


Thrombin Time   18.0   (15.1-19.6)  Sec.


 


Sodium    140  (137-145)  mmol/L


 


Potassium    3.9  (3.6-5.0)  mmol/L


 


Chloride    102.8  ()  mmol/L


 


Carbon Dioxide    25  (22-30)  mmol/L


 


Anion Gap    16  mmol/L


 


BUN    14  (7-17)  mg/dL


 


Creatinine    0.6  (0.6-1.2)  mg/dL


 


Estimated GFR    > 60  ml/min


 


BUN/Creatinine Ratio    23  %


 


Glucose    166 H  ()  mg/dL


 


POC Glucose     ()  mg/dL


 


Calcium    8.7  (8.4-10.2)  mg/dL


 


Total Creatine Kinase     ()  units/L


 


CK-MB (CK-2)     (0.0-4.0)  ng/mL


 


CK-MB (CK-2) Rel Index     (0-4)  


 


Troponin T    < 0.010  (0.00-0.029)  ng/mL














  04/30/21 04/30/21 Range/Units





  05:41 06:14 


 


WBC    (4.5-11.0)  K/mm3


 


RBC    (3.65-5.03)  M/mm3


 


Hgb    (10.1-14.3)  gm/dl


 


Hct    (30.3-42.9)  %


 


MCV    (79-97)  fl


 


MCH    (28-32)  pg


 


MCHC    (30-34)  %


 


RDW    (13.2-15.2)  %


 


Plt Count    (140-440)  K/mm3


 


Lymph % (Auto)    (13.4-35.0)  %


 


Mono % (Auto)    (0.0-7.3)  %


 


Eos % (Auto)    (0.0-4.3)  %


 


Baso % (Auto)    (0.0-1.8)  %


 


Lymph # (Auto)    (1.2-5.4)  K/mm3


 


Mono # (Auto)    (0.0-0.8)  K/mm3


 


Eos # (Auto)    (0.0-0.4)  K/mm3


 


Baso # (Auto)    (0.0-0.1)  K/mm3


 


Seg Neutrophils %    (40.0-70.0)  %


 


Seg Neutrophils #    (1.8-7.7)  K/mm3


 


PT    (12.2-14.9)  Sec.


 


INR    (0.87-1.13)  


 


APTT    (24.2-36.6)  Sec.


 


Thrombin Time    (15.1-19.6)  Sec.


 


Sodium    (137-145)  mmol/L


 


Potassium    (3.6-5.0)  mmol/L


 


Chloride    ()  mmol/L


 


Carbon Dioxide    (22-30)  mmol/L


 


Anion Gap    mmol/L


 


BUN    (7-17)  mg/dL


 


Creatinine    (0.6-1.2)  mg/dL


 


Estimated GFR    ml/min


 


BUN/Creatinine Ratio    %


 


Glucose    ()  mg/dL


 


POC Glucose   143 H  ()  mg/dL


 


Calcium    (8.4-10.2)  mg/dL


 


Total Creatine Kinase  36   ()  units/L


 


CK-MB (CK-2)  2.1   (0.0-4.0)  ng/mL


 


CK-MB (CK-2) Rel Index  5.8 H   (0-4)  


 


Troponin T    (0.00-0.029)  ng/mL














- Radiology Data


Radiology results: report reviewed, image reviewed


interpreted by me: 





Chest x-ray: No pneumonia, no pneumothorax, no osseous findings. ET tube and NG 

tube in satisfactory positions.








 


CT HEAD WITHOUT CONTRAST  


 


 INDICATION / CLINICAL INFORMATION: CODE STROKE PROTOCOL!!  Stroke-Like 

Symptoms, Unresponsive.  


 


 TECHNIQUE: All CT scans at this location are performed using CT dose reduction 

for ALARA by means 


of automated exposure control.   


 


 COMPARISON: 01/04/20.  


 


 FINDINGS:  


 HEMORRHAGE: There is a large area of acute hemorrhage involving the mid to 

upper juancarlos and the lower


midbrain. The hemorrhage measures approximately 4.4 cm in greatest dimension. 

The hemorrhage extends


posteriorly on the left into the fourth ventricle.  


 EXTRA-AXIAL SPACES: Moderately prominent likely related to cortical atrophy.  


 VENTRICULAR SYSTEM: Moderately enlarged likely related to central atrophy. 

Mildly dilated temporal 


horns.  


 CEREBRAL PARENCHYMA: Extensive small vessel ischemic changes in the 

periventricular white matter 


and gangliocapsular regions bilaterally. No acute territorial infarct.   


 MIDLINE SHIFT / HERNIATION: None.  


 CEREBELLUM / BRAINSTEM: No significant abnormality.  


 


 ORBITS: Normal as visualized.  


 SOFT TISSUES: No significant abnormality.  


 SKULL: No significant abnormality.  


 PARANASAL SINUSES / MASTOID AIR CELLS: Normal as visualized.  


 


 ADDITIONAL FINDINGS: None.  


 


 IMPRESSION: Large acute parenchymal hemorrhage involving the brainstem. There 

is intraventricular 


extension into the fourth ventricle with evidence of early hydrocephalus.  


=========================================================================








CHEST 1 VIEW 4/30/2021 5:17 AM  


 


 INDICATION / CLINICAL INFORMATION: ETT placement.  


 


 COMPARISON: 01/04/20  


 


 FINDINGS:  


 


 SUPPORT DEVICES: There is an endotracheal tube with the tip approximately 4 cm 

above the susie. 


The tip of the nasogastric tube overlies the distal stomach or duodenal bulb.  


 HEART / MEDIASTINUM: The heart size and pulmonary vasculature are normal.   


 LUNGS / PLEURA: No significant pulmonary or pleural abnormality. No pneumothor

ax.   


 


 ADDITIONAL FINDINGS: No significant additional findings.  


 


 IMPRESSION:  


 1. ET tube and NG tube in satisfactory position radiographically.   


 2. No acute abnormality.  


=========================================================================





- Medical Decision Making





Patient is a 75-year-old female who presents emergency room for unresponsiveness

and agonal breathing.  Patient was immediately intubated after initial 

evaluation patient had an IO placed because the nurses were unable to obtain a 

peripheral line and then the patient was intubated.  See procedure notes.  

Patient also found to have severely elevated blood pressure above 250/130.  

Patient's blood pressure improved with propofol and patient was placed on a cart

nicardipine drip.  Patient had a code stroke initiated after initial evaluation 

and patient was found to have a pontine bleed.  I did consult neurosurgery here 

and they recommended transfer.  I discussed this with [facility] and they 

accepted the patient to be transferred.  The patient's blood pressure was kept 

below 140 with nicardipine.  After the results came back from CT scan and the 

hydrocephalus was noted, made mannitol 100 g was ordered.  Patient transferred 

to high-level care with the neuro ICU.








Critical care time documented due to the multiple reassessments, prolonged time 

at the bedside, interpretation of diagnostics and labs.

















- Differential Diagnosis


Intracranial hemorrhage, stroke, respiratory failure, altered mental status


Critical Care Time: Yes


Critical care time in (mins) excluding proc time.: 80


Critical care attestation.: 


If time is entered above; I have spent that time in minutes in the direct care 

of this critically ill patient, excluding procedure time.





Critical Care Time: 





80 minutes











ED Disposition


Clinical Impression: 


 Unresponsive state, Hypertensive emergency





ICH (intracerebral hemorrhage)


Qualifiers:


 Intracerebral hemorrhage etiology: nontraumatic Cerebral hemorrhage location: 

unspecified cerebral location Laterality: unspecified laterality Qualified 

Code(s): I61.9 - Nontraumatic intracerebral hemorrhage, unspecified





Altered mental state


Qualifiers:


 Altered mental status type: unspecified Qualified Code(s): R41.82 - Altered 

mental status, unspecified





Respiratory failure


Qualifiers:


 Chronicity: acute Respiratory failure complication: hypoxia Qualified Code(s): 

J96.01 - Acute respiratory failure with hypoxia





Disposition: DC/TX-70 ANOTHER TYPE HLTHCARE


Is pt being admited?: No


Does the pt Need Aspirin: No


Condition: Critical


Instructions:  Hypertension (ED)


Referrals: 


PRIMARY CARE,MD [Primary Care Provider] - 3-5 Days


Time of Disposition: 06:14